# Patient Record
Sex: MALE | HISPANIC OR LATINO | Employment: FULL TIME
[De-identification: names, ages, dates, MRNs, and addresses within clinical notes are randomized per-mention and may not be internally consistent; named-entity substitution may affect disease eponyms.]

---

## 2017-03-13 ENCOUNTER — LAB SERVICES (OUTPATIENT)
Dept: OTHER | Age: 62
End: 2017-03-13

## 2017-03-13 ENCOUNTER — CHARTING TRANS (OUTPATIENT)
Dept: OTHER | Age: 62
End: 2017-03-13

## 2017-03-13 ENCOUNTER — CHARTING TRANS (OUTPATIENT)
Dept: INTERNAL MEDICINE | Age: 62
End: 2017-03-13

## 2017-03-13 LAB
ALBUMIN SERPL BCG-MCNC: 4.5 G/DL (ref 3.6–5.1)
ALP SERPL-CCNC: 84 U/L (ref 30–130)
ALT SERPL W/O P-5'-P-CCNC: 17 U/L (ref 10–35)
AST SERPL-CCNC: 13 U/L (ref 9–37)
BILIRUB SERPL-MCNC: 0.4 MG/DL (ref 0–1)
BUN SERPL-MCNC: 10 MG/DL (ref 6–27)
CALCIUM SERPL-MCNC: 9.3 MG/DL (ref 8.6–10.6)
CHLORIDE SERPL-SCNC: 103 MMOL/L (ref 96–107)
CHOLEST SERPL-MCNC: 221 MG/DL (ref 140–200)
CREATININE, SERUM: 0.8 MG/DL (ref 0.6–1.6)
CRP SERPL-MCNC: <0.5 MG/DL (ref 0–1)
DIFFERENTIAL TYPE: ABNORMAL
GFR SERPL CREATININE-BSD FRML MDRD: >60 ML/MIN/{1.73M2}
GFR SERPL CREATININE-BSD FRML MDRD: >60 ML/MIN/{1.73M2}
GLUCOSE P FAST SERPL-MCNC: 108 MG/DL (ref 60–100)
HCO3 SERPL-SCNC: 30 MMOL/L (ref 22–32)
HDLC SERPL-MCNC: 45 MG/DL
HEMATOCRIT: 46.4 % (ref 40–51)
HEMOGLOBIN: 16.5 G/DL (ref 13.7–17.5)
LDLC SERPL CALC-MCNC: 156 MG/DL (ref 0–100)
LYMPH PERCENT: 20.7 % (ref 20.5–51.1)
LYMPHOCYTE ABSOLUTE #: 1.1 10*3/UL (ref 1.2–3.4)
MEAN CORPUSCULAR HGB CONCENTRATION: 35.6 % (ref 32–36)
MEAN CORPUSCULAR HGB: 30 PG (ref 27–34)
MEAN CORPUSCULAR VOLUME: 84.4 FL (ref 79–95)
MEAN PLATELET VOLUME: 9.6 FL (ref 8.6–12.4)
MIXED %: 7.1 % (ref 4.3–12.9)
MIXED ABSOLUTE #: 0.4 10*3/UL (ref 0.2–0.9)
NEUTROPHIL ABSOLUTE #: 3.8 10*3/UL (ref 1.4–6.5)
NEUTROPHIL PERCENT: 72.2 % (ref 34–73.5)
PLATELET COUNT: 194 10*3/UL (ref 150–400)
POTASSIUM SERPL-SCNC: 4.5 MMOL/L (ref 3.5–5.3)
PROT SERPL-MCNC: 6.8 G/DL (ref 6.2–8.1)
RED BLOOD CELL COUNT: 5.5 10*6/UL (ref 3.9–5.7)
RED CELL DISTRIBUTION WIDTH: 12.9 % (ref 11.3–14.8)
SODIUM SERPL-SCNC: 141 MMOL/L (ref 136–146)
TRIGL SERPL-MCNC: 102 MG/DL (ref 0–200)
WHITE BLOOD CELL COUNT: 5.3 10*3/UL (ref 4–10)

## 2017-04-21 ENCOUNTER — CHARTING TRANS (OUTPATIENT)
Dept: OTHER | Age: 62
End: 2017-04-21

## 2017-07-12 ENCOUNTER — TELEPHONE (OUTPATIENT)
Dept: SURGERY | Facility: CLINIC | Age: 62
End: 2017-07-12

## 2017-07-12 NOTE — TELEPHONE ENCOUNTER
Patient needs opinion on cervical problem.     Please place on my schedule for Friday 7/14 at 12:30  Please call patient to up date info for [facility] insurance  [phone]

## 2017-07-13 ENCOUNTER — TELEPHONE (OUTPATIENT)
Dept: SURGERY | Facility: CLINIC | Age: 62
End: 2017-07-13

## 2017-07-14 ENCOUNTER — HOSPITAL ENCOUNTER (OUTPATIENT)
Dept: GENERAL RADIOLOGY | Facility: HOSPITAL | Age: 62
Discharge: HOME | End: 2017-07-14
Attending: PHYSICIAN ASSISTANT
Payer: OTHER MISCELLANEOUS

## 2017-07-14 ENCOUNTER — OFFICE VISIT (OUTPATIENT)
Dept: SURGERY | Facility: CLINIC | Age: 62
End: 2017-07-14

## 2017-07-14 DIAGNOSIS — R26.9 GAIT DISORDER: ICD-10-CM

## 2017-07-14 DIAGNOSIS — M47.816 LUMBAR SPONDYLOSIS: ICD-10-CM

## 2017-07-14 DIAGNOSIS — R29.898 WEAKNESS OF LEFT LEG: ICD-10-CM

## 2017-07-14 DIAGNOSIS — R29.898 WEAKNESS OF BOTH ARMS: ICD-10-CM

## 2017-07-14 DIAGNOSIS — M50.022 CERVICAL DISC DISORDER AT C5-C6 LEVEL WITH MYELOPATHY: ICD-10-CM

## 2017-07-14 DIAGNOSIS — R29.898 SHOULDER WEAKNESS: ICD-10-CM

## 2017-07-14 DIAGNOSIS — M50.022 CERVICAL DISC DISORDER AT C5-C6 LEVEL WITH MYELOPATHY: Primary | ICD-10-CM

## 2017-07-14 DIAGNOSIS — M75.02 ADHESIVE CAPSULITIS OF LEFT SHOULDER: ICD-10-CM

## 2017-07-14 PROBLEM — G95.9 CERVICAL MYELOPATHY WITH CERVICAL RADICULOPATHY (HCC): Status: ACTIVE | Noted: 2017-07-14

## 2017-07-14 PROBLEM — M54.12 CERVICAL MYELOPATHY WITH CERVICAL RADICULOPATHY (HCC): Status: ACTIVE | Noted: 2017-07-14

## 2017-07-14 PROCEDURE — 99204 OFFICE O/P NEW MOD 45 MIN: CPT | Performed by: PHYSICIAN ASSISTANT

## 2017-07-14 PROCEDURE — 72114 X-RAY EXAM L-S SPINE BENDING: CPT | Performed by: PHYSICIAN ASSISTANT

## 2017-07-14 PROCEDURE — 72052 X-RAY EXAM NECK SPINE 6/>VWS: CPT | Performed by: PHYSICIAN ASSISTANT

## 2017-07-14 RX ORDER — METHYLPREDNISOLONE 4 MG/1
TABLET ORAL
Qty: 1 PACKAGE | Refills: 0 | Status: SHIPPED | OUTPATIENT
Start: 2017-07-14 | End: 2017-07-27 | Stop reason: ALTCHOICE

## 2017-07-14 RX ORDER — HYDROCODONE BITARTRATE AND ACETAMINOPHEN 10; 325 MG/1; MG/1
1 TABLET ORAL EVERY 6 HOURS PRN
Qty: 90 TABLET | Refills: 0 | Status: SHIPPED | OUTPATIENT
Start: 2017-07-14 | End: 2017-09-06

## 2017-07-14 RX ORDER — CYCLOBENZAPRINE HCL 10 MG
10 TABLET ORAL 3 TIMES DAILY PRN
Qty: 45 TABLET | Refills: 0 | Status: SHIPPED | OUTPATIENT
Start: 2017-07-14 | End: 2017-09-06

## 2017-07-14 NOTE — PROGRESS NOTES
Location of Pain: neck pain radiating down left arm    Date Pain Began: 2 weeks after surgery for WC (11/28/2016)          Work Related:   Yes        Receiving Work Comp/Disability:   Yes    Numeric Rating Scale:  Pain at Present:  2

## 2017-07-14 NOTE — H&P
Neurosurgery Consultation      REASON FOR CONSULT: Cervical myelopathy    HISTORY OF PRESENT Elizabeth Nicholas is a 63year old [address] male here for cervical pain left arm pain and weakness and left leg weakness.     Patient gives a history of being a pi therapy immediately he explained his therapist how much pain he was having which was atypical from his first experience. The pain progressed into left bicipital throbbing and aching and burning.   Then started to shoot down into his left second through fou trying to get dressed brushes teeth or use his left arm and manipulation of his neck as far as turning. Complains of pain down the left C6 and C7 dermatome going into the index middle and ring finger. He has pain into the left biceps.   He gets numbness a 20 yrs ago  3/11/16, 11/08/2016: OTHER Left      Comment: left index finger  25 yrs ago: OTHER Left      Comment: achillis tendon repair  1982: OTHER Left      Comment: knee scope     FAMILY HISTORY:  family history is not on file.     SOCIAL HISTORY:   rep flexion-extension. Negative straight leg raise. With cervical forward flexion he gets spasm and tightness in his left wrist and forearm and his left lower leg. . With cervical extension and right rotation spasticity improves.     Upper extremity strength: appears to be myelopathy and difficulty with ambulation. 5. Work: Off work pending reevaluation  6. FU with Dr Felisa Woodson for left fourth finger as requested.   &. PT to maintain motion in left shoulder    It appears from the patient's clinical history that

## 2017-07-19 ENCOUNTER — TELEPHONE (OUTPATIENT)
Dept: SURGERY | Facility: CLINIC | Age: 62
End: 2017-07-19

## 2017-07-19 NOTE — TELEPHONE ENCOUNTER
Please change apt from 7/20 to next Thursday 7/27 at 2:30pm  Patient is aware due his MRIs are schedule for 7/25  Thanks  [place]

## 2017-07-20 ENCOUNTER — TELEPHONE (OUTPATIENT)
Dept: SURGERY | Facility: CLINIC | Age: 62
End: 2017-07-20

## 2017-07-20 RX ORDER — LORAZEPAM 2 MG/1
2 TABLET ORAL ONCE
Qty: 2 TABLET | Refills: 0 | Status: SHIPPED | OUTPATIENT
Start: 2017-07-20 | End: 2017-07-20

## 2017-07-21 ENCOUNTER — TELEPHONE (OUTPATIENT)
Dept: SURGERY | Facility: CLINIC | Age: 62
End: 2017-07-21

## 2017-07-25 ENCOUNTER — HOSPITAL ENCOUNTER (OUTPATIENT)
Dept: GENERAL RADIOLOGY | Age: 62
Discharge: HOME | End: 2017-07-25
Attending: PHYSICIAN ASSISTANT
Payer: OTHER MISCELLANEOUS

## 2017-07-25 ENCOUNTER — HOSPITAL ENCOUNTER (OUTPATIENT)
Dept: MRI IMAGING | Age: 62
Discharge: HOME | End: 2017-07-25
Attending: PHYSICIAN ASSISTANT
Payer: OTHER MISCELLANEOUS

## 2017-07-25 DIAGNOSIS — Z13.89 ENCOUNTER FOR IMAGING TO SCREEN FOR METAL PRIOR TO MRI: ICD-10-CM

## 2017-07-25 DIAGNOSIS — M47.816 LUMBAR SPONDYLOSIS: ICD-10-CM

## 2017-07-25 DIAGNOSIS — M50.022 CERVICAL DISC DISORDER AT C5-C6 LEVEL WITH MYELOPATHY: ICD-10-CM

## 2017-07-25 DIAGNOSIS — R26.9 GAIT DISORDER: ICD-10-CM

## 2017-07-25 DIAGNOSIS — R29.898 WEAKNESS OF BOTH ARMS: ICD-10-CM

## 2017-07-25 DIAGNOSIS — R29.898 WEAKNESS OF LEFT LEG: ICD-10-CM

## 2017-07-25 DIAGNOSIS — R29.898 SHOULDER WEAKNESS: ICD-10-CM

## 2017-07-25 PROCEDURE — 72141 MRI NECK SPINE W/O DYE: CPT | Performed by: PHYSICIAN ASSISTANT

## 2017-07-25 PROCEDURE — 70030 X-RAY EYE FOR FOREIGN BODY: CPT | Performed by: PHYSICIAN ASSISTANT

## 2017-07-25 PROCEDURE — 72148 MRI LUMBAR SPINE W/O DYE: CPT | Performed by: PHYSICIAN ASSISTANT

## 2017-07-27 ENCOUNTER — OFFICE VISIT (OUTPATIENT)
Dept: SURGERY | Facility: CLINIC | Age: 62
End: 2017-07-27

## 2017-07-27 VITALS
HEART RATE: 108 BPM | BODY MASS INDEX: 33.34 KG/M2 | DIASTOLIC BLOOD PRESSURE: 102 MMHG | WEIGHT: 220 LBS | HEIGHT: 68 IN | SYSTOLIC BLOOD PRESSURE: 178 MMHG

## 2017-07-27 DIAGNOSIS — R29.898 WEAKNESS OF LEFT LEG: ICD-10-CM

## 2017-07-27 DIAGNOSIS — M50.022 CERVICAL DISC DISORDER AT C5-C6 LEVEL WITH MYELOPATHY: Primary | ICD-10-CM

## 2017-07-27 DIAGNOSIS — R29.898 WEAKNESS OF BOTH ARMS: ICD-10-CM

## 2017-07-27 PROCEDURE — 99213 OFFICE O/P EST LOW 20 MIN: CPT | Performed by: PHYSICIAN ASSISTANT

## 2017-07-27 RX ORDER — AMLODIPINE BESYLATE 5 MG/1
TABLET ORAL
Refills: 6 | Status: ON HOLD | COMMUNITY
Start: 2017-05-17 | End: 2019-01-01

## 2017-07-27 RX ORDER — METOPROLOL SUCCINATE 25 MG/1
25 TABLET, EXTENDED RELEASE ORAL DAILY
Refills: 5 | COMMUNITY

## 2017-07-27 RX ORDER — SIMVASTATIN 20 MG
TABLET ORAL
Refills: 6 | Status: ON HOLD | COMMUNITY
Start: 2017-06-22 | End: 2019-01-01

## 2017-07-27 NOTE — PATIENT INSTRUCTIONS
Refill policies:    • Allow 2-3 business days for refills; controlled substances may take longer.   • Contact your pharmacy at least 5 days prior to running out of medication and have them send an electronic request or submit request through the [facility] have a procedure or additional testing performed. [facility]) will contact your insurance carrier to obtain pre-certification or prior authorization.     Unfortunately, [facility] has seen an increase in denial of payment even though the p

## 2017-07-27 NOTE — PROGRESS NOTES
Neurosurgery follow up        REASON FOR CONSULT: Cervical myelopathy     HISTORY OF PRESENT Ethyl Midget is a 63year old LH male here in follow up after MRI CS and LS spine. He continues with left cervical radiculopathy. Medrol helped.  PT is finger.     He underwent surgery on November 8, 2016 to repair the A2 and A4 pulleys. He had a [place] block without complication.   After the surgery he felt a difference in his left hand he had sharp stabbing pain in his left hand that he did experience fro upper thoracic spine he gets pain into both scapular the left is much worse in the right it is a sharp pain he gets tingling down his back when he moves his neck. He complains of lower back stiffness and tightness and low back pain which is a 4/10.   State neurological complaints. He does have intermittent tinnitus in the right ear he has natural hearing loss due to his job.            PAST MEDICAL HISTORY:       Past Medical History:   Diagnosis Date   • Essential hypertension     • Hyperlipidemia          clonus on the right, 3 beats of clonus on the left. Patient can heel and toe walk. Positive left Spurling's. Bilateral Gonzalez's. Difficulty with tandem stance with eyes closed with significant unsteadiness.   Patient can toe walk with full weightbearing for formulating a clear diagnosis     ASSESSMENT:  1. Cervical 5-6 radiculopathy with myelopathy in the upper and lower extremities  2. Left upper extremity weakness, bilateral hand weakness left lower extremity weakness  3. Gait disturbance  4.   Status

## 2017-07-31 ENCOUNTER — TELEPHONE (OUTPATIENT)
Dept: SURGERY | Facility: CLINIC | Age: 62
End: 2017-07-31

## 2017-08-02 ENCOUNTER — TELEPHONE (OUTPATIENT)
Dept: SURGERY | Facility: CLINIC | Age: 62
End: 2017-08-02

## 2017-08-07 ENCOUNTER — TELEPHONE (OUTPATIENT)
Dept: SURGERY | Facility: CLINIC | Age: 62
End: 2017-08-07

## 2017-08-07 NOTE — TELEPHONE ENCOUNTER
Please call patient and make an appointment for consult with us on 8/21/17 at 1:30 PM to be evaluated for injection.

## 2017-08-07 NOTE — TELEPHONE ENCOUNTER
Pt scheduled WC Consult appt for 8/21 with [facility] for evaluation for injections

## 2017-08-10 NOTE — TELEPHONE ENCOUNTER
infromed pt that WC denied eligibility for 8/21 appt -pt will have to use his regular insurance,pt understood and no further questions

## 2017-08-11 ENCOUNTER — TELEPHONE (OUTPATIENT)
Dept: SURGERY | Facility: CLINIC | Age: 62
End: 2017-08-11

## 2017-08-11 NOTE — TELEPHONE ENCOUNTER
Patient called stating having a decline in his symptoms he is getting more weakness. In spite of physical therapy he would like to schedule surgery. We will see him on Tuesday at noon.

## 2017-08-15 ENCOUNTER — TELEPHONE (OUTPATIENT)
Dept: SURGERY | Facility: CLINIC | Age: 62
End: 2017-08-15

## 2017-08-15 ENCOUNTER — OFFICE VISIT (OUTPATIENT)
Dept: SURGERY | Facility: CLINIC | Age: 62
End: 2017-08-15

## 2017-08-15 VITALS
HEIGHT: 68 IN | BODY MASS INDEX: 33.34 KG/M2 | SYSTOLIC BLOOD PRESSURE: 150 MMHG | DIASTOLIC BLOOD PRESSURE: 96 MMHG | HEART RATE: 80 BPM | WEIGHT: 220 LBS

## 2017-08-15 DIAGNOSIS — M50.022 CERVICAL DISC DISORDER AT C5-C6 LEVEL WITH MYELOPATHY: Primary | ICD-10-CM

## 2017-08-15 DIAGNOSIS — G95.9 CERVICAL MYELOPATHY WITH CERVICAL RADICULOPATHY (HCC): Primary | ICD-10-CM

## 2017-08-15 DIAGNOSIS — M54.12 CERVICAL MYELOPATHY WITH CERVICAL RADICULOPATHY (HCC): Primary | ICD-10-CM

## 2017-08-15 PROCEDURE — 99213 OFFICE O/P EST LOW 20 MIN: CPT | Performed by: PHYSICIAN ASSISTANT

## 2017-08-15 NOTE — PROGRESS NOTES
Pt. States leg is still weak and feels burning at base of neck, arm pain when lifting and pinching dull pain between scapula still remains.

## 2017-08-15 NOTE — PATIENT INSTRUCTIONS
Refill policies:    • Allow 2-3 business days for refills; controlled substances may take longer.   • Contact your pharmacy at least 5 days prior to running out of medication and have them send an electronic request or submit request through the [facility] have a procedure or additional testing performed. [facility]) will contact your insurance carrier to obtain pre-certification or prior authorization.     Unfortunately, [facility] has seen an increase in denial of payment even though the p

## 2017-08-15 NOTE — PROGRESS NOTES
Neurosurgery follow up        REASON FOR CONSULT: Cervical myelopathy     HISTORY OF PRESENT Fabian Samano is a 63year old LH male  returns today stating that with therapy and the Medrol he got slight improvement at his last visit.   He has had after which time there was another pop. He was diagnosed with another rupture of the A2 pulley was placed in rehab from July until September he was placed in work conditioning program.     September 2016 he saw Dr. Julito Pugh for his left finger disability. weakness. Denies any weakness in the arms or legs.   Denies any left finger problems prior to his injury November 30, 2015.     He denies any cervical symptoms left arm radiculopathy or pain numbness or weakness or any leg complaints prior to anesthesia on 8 miles a day. Now he has difficulty with ambulation his maximum distance about 3 blocks where his legs get tired and his left leg kind of drags. He has difficulty getting from a sitting to standing position he has difficulty on uneven surfaces.   He is t bilaterally     NEUROLOGICAL:  last exam  This patient is alert and orientated x 3. Speech fluent. Comprehension intact. Face is symmetrical. Sensation to light touch is intact bilateral in both arms and legs to touch.   Decreased pin sensation in both up lumbar spine 7/25/17 L4-5-S1 spondylosis without herniation os significant stenosis        MRI of the cervical spine brings for review from [facility] date 5/5/17 is a suboptimal study with images only partially of sagittal T2 that does not c

## 2017-08-15 NOTE — TELEPHONE ENCOUNTER
Dr. Junior Nails write a letter to the petition the Workmen's Comp. Denial    Patient would like to put his surgery through his private insurance if not approved. Patient needs a C5-6 artificial disc replacement.   Possibly C6-7

## 2017-08-17 ENCOUNTER — TELEPHONE (OUTPATIENT)
Dept: SURGERY | Facility: CLINIC | Age: 62
End: 2017-08-17

## 2017-08-24 ENCOUNTER — TELEPHONE (OUTPATIENT)
Dept: SURGERY | Facility: CLINIC | Age: 62
End: 2017-08-24

## 2017-09-01 NOTE — TELEPHONE ENCOUNTER
Patient called asking about surgery informed him of below update. Will need to touch base with Denice/ to find out what the status is. Will call patient back next week once clarification is received. Patient understood.

## 2017-09-06 ENCOUNTER — TELEPHONE (OUTPATIENT)
Dept: SURGERY | Facility: CLINIC | Age: 62
End: 2017-09-06

## 2017-09-06 RX ORDER — CYCLOBENZAPRINE HCL 10 MG
10 TABLET ORAL 3 TIMES DAILY PRN
Qty: 60 TABLET | Refills: 2 | Status: SHIPPED | OUTPATIENT
Start: 2017-09-06 | End: 2017-10-16

## 2017-09-06 RX ORDER — HYDROCODONE BITARTRATE AND ACETAMINOPHEN 10; 325 MG/1; MG/1
1 TABLET ORAL EVERY 6 HOURS PRN
Qty: 90 TABLET | Refills: 0 | Status: ON HOLD | OUTPATIENT
Start: 2017-09-06 | End: 2017-10-03

## 2017-09-06 NOTE — TELEPHONE ENCOUNTER
Surgery denied by WC. Patient is getting more myelopathic and wishes to schedule surgery through his regular insurance.

## 2017-09-07 ENCOUNTER — LAB SERVICES (OUTPATIENT)
Dept: OTHER | Age: 62
End: 2017-09-07

## 2017-09-07 ENCOUNTER — CHARTING TRANS (OUTPATIENT)
Dept: INTERNAL MEDICINE | Age: 62
End: 2017-09-07

## 2017-09-07 LAB
ALBUMIN SERPL BCG-MCNC: 4.6 G/DL (ref 3.6–5.1)
ALP SERPL-CCNC: 82 U/L (ref 30–130)
ALT SERPL W/O P-5'-P-CCNC: 17 U/L (ref 10–35)
AST SERPL-CCNC: 13 U/L (ref 9–37)
BILIRUB SERPL-MCNC: 0.4 MG/DL (ref 0–1)
BUN SERPL-MCNC: 13 MG/DL (ref 6–27)
CALCIUM SERPL-MCNC: 9.2 MG/DL (ref 8.6–10.6)
CHLORIDE SERPL-SCNC: 103 MMOL/L (ref 96–107)
CHOLEST SERPL-MCNC: 150 MG/DL (ref 140–200)
CREATININE, SERUM: 0.9 MG/DL (ref 0.6–1.6)
GFR SERPL CREATININE-BSD FRML MDRD: >60 ML/MIN/{1.73M2}
GFR SERPL CREATININE-BSD FRML MDRD: >60 ML/MIN/{1.73M2}
GLUCOSE P FAST SERPL-MCNC: 101 MG/DL (ref 60–100)
HCO3 SERPL-SCNC: 28 MMOL/L (ref 22–32)
HDLC SERPL-MCNC: 46 MG/DL
LDLC SERPL CALC-MCNC: 88 MG/DL (ref 0–100)
POTASSIUM SERPL-SCNC: 4.1 MMOL/L (ref 3.5–5.3)
PROT SERPL-MCNC: 6.5 G/DL (ref 6.2–8.1)
SODIUM SERPL-SCNC: 139 MMOL/L (ref 136–146)
TRIGL SERPL-MCNC: 80 MG/DL (ref 0–200)

## 2017-09-07 NOTE — TELEPHONE ENCOUNTER
We can offer 10/2 or 10/9 for surgery and will discuss with Dr. Carlitos Hughes next about moving up his date. [place], [place] is aware of the scheduling situation.      Patient scheduled for C5-6 ADR on 10/2/2017 with Dr. Carlitos Hughes    Pre-op instructions discussed with elisa ICD-10--M50.022  CPT---73532

## 2017-09-08 ENCOUNTER — TELEPHONE (OUTPATIENT)
Dept: SURGERY | Facility: CLINIC | Age: 62
End: 2017-09-08

## 2017-09-11 ENCOUNTER — CHARTING TRANS (OUTPATIENT)
Dept: OTHER | Age: 62
End: 2017-09-11

## 2017-09-11 NOTE — TELEPHONE ENCOUNTER
Clarification of surgical procedure sent to OR schedulers. Consent should read: C5-6 discectomy and ADR, Possible C6-7 discectomy and ADR.

## 2017-09-11 NOTE — TELEPHONE ENCOUNTER
Have attempted to fax medical records and request for medical clearance to PCP office. Fax # provided does not go through. Patient calling office and states he had physical done last week, asking if this is ok for clearance.  I informed patient that his PCP

## 2017-09-13 ENCOUNTER — APPOINTMENT (OUTPATIENT)
Dept: LAB | Facility: HOSPITAL | Age: 62
End: 2017-09-13
Payer: COMMERCIAL

## 2017-09-13 ENCOUNTER — LABORATORY ENCOUNTER (OUTPATIENT)
Dept: LAB | Facility: HOSPITAL | Age: 62
End: 2017-09-13
Attending: NEUROLOGICAL SURGERY
Payer: COMMERCIAL

## 2017-09-13 DIAGNOSIS — M54.12 CERVICAL MYELOPATHY WITH CERVICAL RADICULOPATHY (HCC): ICD-10-CM

## 2017-09-13 DIAGNOSIS — G95.9 CERVICAL MYELOPATHY WITH CERVICAL RADICULOPATHY (HCC): ICD-10-CM

## 2017-09-13 LAB
ALBUMIN SERPL-MCNC: 3.9 G/DL (ref 3.5–4.8)
ALP LIVER SERPL-CCNC: 93 U/L (ref 45–117)
ALT SERPL-CCNC: 39 U/L (ref 17–63)
APTT PPP: 30.4 SECONDS (ref 25–34)
AST SERPL-CCNC: 24 U/L (ref 15–41)
BASOPHILS # BLD AUTO: 0.03 X10(3) UL (ref 0–0.1)
BASOPHILS NFR BLD AUTO: 0.4 %
BILIRUB SERPL-MCNC: 0.5 MG/DL (ref 0.1–2)
BUN BLD-MCNC: 17 MG/DL (ref 8–20)
CALCIUM BLD-MCNC: 8.7 MG/DL (ref 8.3–10.3)
CHLORIDE: 107 MMOL/L (ref 101–111)
CO2: 28 MMOL/L (ref 22–32)
CREAT BLD-MCNC: 1 MG/DL (ref 0.7–1.3)
EOSINOPHIL # BLD AUTO: 0.32 X10(3) UL (ref 0–0.3)
EOSINOPHIL NFR BLD AUTO: 4.5 %
ERYTHROCYTE [DISTWIDTH] IN BLOOD BY AUTOMATED COUNT: 12.1 % (ref 11.5–16)
GLUCOSE BLD-MCNC: 93 MG/DL (ref 70–99)
HCT VFR BLD AUTO: 45.7 % (ref 37–53)
HGB BLD-MCNC: 15.5 G/DL (ref 13–17)
IMMATURE GRANULOCYTE COUNT: 0.02 X10(3) UL (ref 0–1)
IMMATURE GRANULOCYTE RATIO %: 0.3 %
INR BLD: 1.04 (ref 0.89–1.11)
LYMPHOCYTES # BLD AUTO: 1.9 X10(3) UL (ref 0.9–4)
LYMPHOCYTES NFR BLD AUTO: 27 %
M PROTEIN MFR SERPL ELPH: 7.1 G/DL (ref 6.1–8.3)
MCH RBC QN AUTO: 29.1 PG (ref 27–33.2)
MCHC RBC AUTO-ENTMCNC: 33.9 G/DL (ref 31–37)
MCV RBC AUTO: 85.9 FL (ref 80–99)
MONOCYTES # BLD AUTO: 0.7 X10(3) UL (ref 0.1–0.6)
MONOCYTES NFR BLD AUTO: 9.9 %
NEUTROPHIL ABS PRELIM: 4.07 X10 (3) UL (ref 1.3–6.7)
NEUTROPHILS # BLD AUTO: 4.07 X10(3) UL (ref 1.3–6.7)
NEUTROPHILS NFR BLD AUTO: 57.9 %
PLATELET # BLD AUTO: 207 10(3)UL (ref 150–450)
POTASSIUM SERPL-SCNC: 4.8 MMOL/L (ref 3.6–5.1)
PSA SERPL DL<=0.01 NG/ML-MCNC: 13.6 SECONDS (ref 12–14.3)
RBC # BLD AUTO: 5.32 X10(6)UL (ref 4.3–5.7)
RED CELL DISTRIBUTION WIDTH-SD: 37.8 FL (ref 35.1–46.3)
SODIUM SERPL-SCNC: 141 MMOL/L (ref 136–144)
WBC # BLD AUTO: 7 X10(3) UL (ref 4–13)

## 2017-09-13 PROCEDURE — 87081 CULTURE SCREEN ONLY: CPT

## 2017-09-13 PROCEDURE — 85610 PROTHROMBIN TIME: CPT

## 2017-09-13 PROCEDURE — 85025 COMPLETE CBC W/AUTO DIFF WBC: CPT

## 2017-09-13 PROCEDURE — 93010 ELECTROCARDIOGRAM REPORT: CPT | Performed by: INTERNAL MEDICINE

## 2017-09-13 PROCEDURE — 93005 ELECTROCARDIOGRAM TRACING: CPT

## 2017-09-13 PROCEDURE — 80053 COMPREHEN METABOLIC PANEL: CPT

## 2017-09-13 PROCEDURE — 85730 THROMBOPLASTIN TIME PARTIAL: CPT

## 2017-09-13 PROCEDURE — 36415 COLL VENOUS BLD VENIPUNCTURE: CPT

## 2017-09-14 ENCOUNTER — CHARTING TRANS (OUTPATIENT)
Dept: OTHER | Age: 62
End: 2017-09-14

## 2017-09-14 ENCOUNTER — CHARTING TRANS (OUTPATIENT)
Dept: INTERNAL MEDICINE | Age: 62
End: 2017-09-14

## 2017-09-14 LAB
ATRIAL RATE: 84 BPM
P AXIS: 54 DEGREES
P-R INTERVAL: 168 MS
Q-T INTERVAL: 376 MS
QRS DURATION: 118 MS
QTC CALCULATION (BEZET): 444 MS
R AXIS: 33 DEGREES
T AXIS: -2 DEGREES
VENTRICULAR RATE: 84 BPM

## 2017-09-20 ENCOUNTER — HOSPITAL ENCOUNTER (OUTPATIENT)
Dept: GENERAL RADIOLOGY | Facility: HOSPITAL | Age: 62
Discharge: HOME | End: 2017-09-20
Attending: INTERNAL MEDICINE
Payer: COMMERCIAL

## 2017-09-20 DIAGNOSIS — Z01.818 PRE-OP TESTING: ICD-10-CM

## 2017-09-20 PROCEDURE — 71020 XR CHEST PA + LAT CHEST (CPT=71020): CPT | Performed by: INTERNAL MEDICINE

## 2017-09-21 NOTE — TELEPHONE ENCOUNTER
Patient has been cleared by PCP for surgery. H&P available in media tab. Nothing further needed for upcoming surgery.

## 2017-09-25 NOTE — TELEPHONE ENCOUNTER
Patient called and wanted to know about clearance. Informed patient we already received medical clearance from his PCP and that nothing further is needed for surgery.  Patient understood and was thankful and just asked that we fax the EKG results to his PCP

## 2017-09-26 ENCOUNTER — CHARTING TRANS (OUTPATIENT)
Dept: INTERNAL MEDICINE | Age: 62
End: 2017-09-26

## 2017-10-02 ENCOUNTER — ANESTHESIA EVENT (OUTPATIENT)
Dept: SURGERY | Facility: HOSPITAL | Age: 62
End: 2017-10-02
Payer: COMMERCIAL

## 2017-10-02 ENCOUNTER — SURGERY (OUTPATIENT)
Age: 62
End: 2017-10-02

## 2017-10-02 ENCOUNTER — HOSPITAL ENCOUNTER (OUTPATIENT)
Facility: HOSPITAL | Age: 62
Setting detail: OBSERVATION
Discharge: HOME | End: 2017-10-03
Attending: NEUROLOGICAL SURGERY | Admitting: NEUROLOGICAL SURGERY
Payer: COMMERCIAL

## 2017-10-02 ENCOUNTER — APPOINTMENT (OUTPATIENT)
Dept: GENERAL RADIOLOGY | Facility: HOSPITAL | Age: 62
End: 2017-10-02
Attending: NEUROLOGICAL SURGERY
Payer: COMMERCIAL

## 2017-10-02 ENCOUNTER — ANESTHESIA (OUTPATIENT)
Dept: SURGERY | Facility: HOSPITAL | Age: 62
End: 2017-10-02
Payer: COMMERCIAL

## 2017-10-02 DIAGNOSIS — M54.12 CERVICAL MYELOPATHY WITH CERVICAL RADICULOPATHY (HCC): Primary | ICD-10-CM

## 2017-10-02 DIAGNOSIS — G95.9 CERVICAL MYELOPATHY WITH CERVICAL RADICULOPATHY (HCC): Primary | ICD-10-CM

## 2017-10-02 PROCEDURE — 76001 XR FLUOROSCOPE EXAM >1 HR EXTENSIVE (CPT=76001): CPT | Performed by: NEUROLOGICAL SURGERY

## 2017-10-02 PROCEDURE — 99243 OFF/OP CNSLTJ NEW/EST LOW 30: CPT | Performed by: HOSPITALIST

## 2017-10-02 PROCEDURE — 0RR30JZ REPLACEMENT OF CERVICAL VERTEBRAL DISC WITH SYNTHETIC SUBSTITUTE, OPEN APPROACH: ICD-10-PCS | Performed by: NEUROLOGICAL SURGERY

## 2017-10-02 RX ORDER — DIPHENHYDRAMINE HYDROCHLORIDE 50 MG/ML
25 INJECTION INTRAMUSCULAR; INTRAVENOUS EVERY 4 HOURS PRN
Status: DISCONTINUED | OUTPATIENT
Start: 2017-10-02 | End: 2017-10-03

## 2017-10-02 RX ORDER — SODIUM CHLORIDE, SODIUM LACTATE, POTASSIUM CHLORIDE, CALCIUM CHLORIDE 600; 310; 30; 20 MG/100ML; MG/100ML; MG/100ML; MG/100ML
INJECTION, SOLUTION INTRAVENOUS CONTINUOUS
Status: DISCONTINUED | OUTPATIENT
Start: 2017-10-02 | End: 2017-10-02

## 2017-10-02 RX ORDER — AMLODIPINE BESYLATE 5 MG/1
5 TABLET ORAL 2 TIMES DAILY WITH MEALS
Status: DISCONTINUED | OUTPATIENT
Start: 2017-10-02 | End: 2017-10-03

## 2017-10-02 RX ORDER — MORPHINE SULFATE 4 MG/ML
2 INJECTION, SOLUTION INTRAMUSCULAR; INTRAVENOUS EVERY 2 HOUR PRN
Status: DISCONTINUED | OUTPATIENT
Start: 2017-10-02 | End: 2017-10-03

## 2017-10-02 RX ORDER — SENNOSIDES 8.6 MG
17.2 TABLET ORAL NIGHTLY
Status: DISCONTINUED | OUTPATIENT
Start: 2017-10-02 | End: 2017-10-03

## 2017-10-02 RX ORDER — MORPHINE SULFATE 4 MG/ML
1 INJECTION, SOLUTION INTRAMUSCULAR; INTRAVENOUS EVERY 2 HOUR PRN
Status: DISCONTINUED | OUTPATIENT
Start: 2017-10-02 | End: 2017-10-03

## 2017-10-02 RX ORDER — NALOXONE HYDROCHLORIDE 0.4 MG/ML
80 INJECTION, SOLUTION INTRAMUSCULAR; INTRAVENOUS; SUBCUTANEOUS AS NEEDED
Status: DISCONTINUED | OUTPATIENT
Start: 2017-10-02 | End: 2017-10-02 | Stop reason: HOSPADM

## 2017-10-02 RX ORDER — HYDROCODONE BITARTRATE AND ACETAMINOPHEN 10; 325 MG/1; MG/1
2 TABLET ORAL EVERY 4 HOURS PRN
Status: DISCONTINUED | OUTPATIENT
Start: 2017-10-02 | End: 2017-10-03

## 2017-10-02 RX ORDER — MORPHINE SULFATE 4 MG/ML
4 INJECTION, SOLUTION INTRAMUSCULAR; INTRAVENOUS EVERY 2 HOUR PRN
Status: DISCONTINUED | OUTPATIENT
Start: 2017-10-02 | End: 2017-10-03

## 2017-10-02 RX ORDER — POLYETHYLENE GLYCOL 3350 17 G/17G
17 POWDER, FOR SOLUTION ORAL DAILY PRN
Status: DISCONTINUED | OUTPATIENT
Start: 2017-10-02 | End: 2017-10-03

## 2017-10-02 RX ORDER — ONDANSETRON 2 MG/ML
4 INJECTION INTRAMUSCULAR; INTRAVENOUS AS NEEDED
Status: DISCONTINUED | OUTPATIENT
Start: 2017-10-02 | End: 2017-10-02 | Stop reason: HOSPADM

## 2017-10-02 RX ORDER — ONDANSETRON 2 MG/ML
4 INJECTION INTRAMUSCULAR; INTRAVENOUS EVERY 4 HOURS PRN
Status: ACTIVE | OUTPATIENT
Start: 2017-10-02 | End: 2017-10-03

## 2017-10-02 RX ORDER — METOCLOPRAMIDE HYDROCHLORIDE 5 MG/ML
10 INJECTION INTRAMUSCULAR; INTRAVENOUS AS NEEDED
Status: DISCONTINUED | OUTPATIENT
Start: 2017-10-02 | End: 2017-10-02 | Stop reason: HOSPADM

## 2017-10-02 RX ORDER — SODIUM PHOSPHATE, DIBASIC AND SODIUM PHOSPHATE, MONOBASIC 7; 19 G/133ML; G/133ML
1 ENEMA RECTAL ONCE AS NEEDED
Status: DISCONTINUED | OUTPATIENT
Start: 2017-10-02 | End: 2017-10-03

## 2017-10-02 RX ORDER — METOPROLOL SUCCINATE 25 MG/1
25 TABLET, EXTENDED RELEASE ORAL
Status: DISCONTINUED | OUTPATIENT
Start: 2017-10-02 | End: 2017-10-03

## 2017-10-02 RX ORDER — ALPRAZOLAM 0.25 MG/1
0.25 TABLET ORAL NIGHTLY PRN
Status: DISCONTINUED | OUTPATIENT
Start: 2017-10-02 | End: 2017-10-03

## 2017-10-02 RX ORDER — SODIUM CHLORIDE, SODIUM LACTATE, POTASSIUM CHLORIDE, CALCIUM CHLORIDE 600; 310; 30; 20 MG/100ML; MG/100ML; MG/100ML; MG/100ML
INJECTION, SOLUTION INTRAVENOUS CONTINUOUS
Status: DISCONTINUED | OUTPATIENT
Start: 2017-10-02 | End: 2017-10-03

## 2017-10-02 RX ORDER — HYDROMORPHONE HYDROCHLORIDE 1 MG/ML
0.4 INJECTION, SOLUTION INTRAMUSCULAR; INTRAVENOUS; SUBCUTANEOUS EVERY 5 MIN PRN
Status: DISCONTINUED | OUTPATIENT
Start: 2017-10-02 | End: 2017-10-02 | Stop reason: HOSPADM

## 2017-10-02 RX ORDER — DOCUSATE SODIUM 100 MG/1
100 CAPSULE, LIQUID FILLED ORAL 2 TIMES DAILY
Status: DISCONTINUED | OUTPATIENT
Start: 2017-10-02 | End: 2017-10-03

## 2017-10-02 RX ORDER — ALPRAZOLAM 0.25 MG/1
0.25 TABLET ORAL NIGHTLY PRN
COMMUNITY
End: 2017-11-13 | Stop reason: ALTCHOICE

## 2017-10-02 RX ORDER — DIPHENHYDRAMINE HCL 25 MG
25 CAPSULE ORAL EVERY 4 HOURS PRN
Status: DISCONTINUED | OUTPATIENT
Start: 2017-10-02 | End: 2017-10-03

## 2017-10-02 RX ORDER — CYCLOBENZAPRINE HCL 10 MG
10 TABLET ORAL 3 TIMES DAILY PRN
Status: DISCONTINUED | OUTPATIENT
Start: 2017-10-02 | End: 2017-10-02

## 2017-10-02 RX ORDER — SODIUM CHLORIDE 9 MG/ML
INJECTION, SOLUTION INTRAVENOUS CONTINUOUS
Status: DISCONTINUED | OUTPATIENT
Start: 2017-10-02 | End: 2017-10-03

## 2017-10-02 RX ORDER — BISACODYL 10 MG
10 SUPPOSITORY, RECTAL RECTAL
Status: DISCONTINUED | OUTPATIENT
Start: 2017-10-02 | End: 2017-10-03

## 2017-10-02 RX ORDER — DEXAMETHASONE SODIUM PHOSPHATE 10 MG/ML
4 INJECTION, SOLUTION INTRAMUSCULAR; INTRAVENOUS 3 TIMES DAILY
Status: DISCONTINUED | OUTPATIENT
Start: 2017-10-02 | End: 2017-10-02 | Stop reason: SDUPTHER

## 2017-10-02 RX ORDER — DEXAMETHASONE SODIUM PHOSPHATE 4 MG/ML
4 VIAL (ML) INJECTION 3 TIMES DAILY
Status: COMPLETED | OUTPATIENT
Start: 2017-10-02 | End: 2017-10-03

## 2017-10-02 RX ORDER — CYCLOBENZAPRINE HCL 10 MG
10 TABLET ORAL 3 TIMES DAILY PRN
Status: DISCONTINUED | OUTPATIENT
Start: 2017-10-03 | End: 2017-10-03

## 2017-10-02 RX ORDER — HYDROCODONE BITARTRATE AND ACETAMINOPHEN 10; 325 MG/1; MG/1
1 TABLET ORAL EVERY 4 HOURS PRN
Status: DISCONTINUED | OUTPATIENT
Start: 2017-10-02 | End: 2017-10-03

## 2017-10-02 RX ORDER — ACETAMINOPHEN 325 MG/1
650 TABLET ORAL EVERY 4 HOURS PRN
Status: DISCONTINUED | OUTPATIENT
Start: 2017-10-02 | End: 2017-10-03

## 2017-10-02 RX ORDER — PRAVASTATIN SODIUM 10 MG
10 TABLET ORAL NIGHTLY
Status: DISCONTINUED | OUTPATIENT
Start: 2017-10-02 | End: 2017-10-03

## 2017-10-02 RX ORDER — ORPHENADRINE CITRATE 30 MG/ML
30 INJECTION INTRAMUSCULAR; INTRAVENOUS EVERY 6 HOURS
Status: DISCONTINUED | OUTPATIENT
Start: 2017-10-02 | End: 2017-10-03

## 2017-10-02 RX ORDER — METOCLOPRAMIDE HYDROCHLORIDE 5 MG/ML
10 INJECTION INTRAMUSCULAR; INTRAVENOUS EVERY 6 HOURS PRN
Status: DISCONTINUED | OUTPATIENT
Start: 2017-10-02 | End: 2017-10-03

## 2017-10-02 NOTE — BRIEF OP NOTE
Pre-Operative Diagnosis: Cervical myelopathy with cervical radiculopathy [M47.12]     Post-Operative Diagnosis: Cervical myelopathy with cervical radiculopathy [M47.12]     Procedure Performed:   Procedure(s):  CERVICAL 5- CERVICAL 6 DISCECTOMY AND LOLA

## 2017-10-02 NOTE — PROGRESS NOTES
Pt's PCPis Dr Margaret Celaya. Dr Sheba Villavicencio paged with new consult. Dr Don Sandifer on call. Awaiting call back.

## 2017-10-02 NOTE — ANESTHESIA PREPROCEDURE EVALUATION
PRE-OP EVALUATION    Patient Name: Timo Washington    Pre-op Diagnosis: Cervical myelopathy with cervical radiculopathy [M47.12]    Procedure(s):  CERVICAL 5- CERVICAL 6 DISCECTOMY AND ARTIFICIAL DISC REPLACEMENT,  POSSIBLE CERVICAL 6- CERVICVAL 7 DISCE Comment: 20 yrs ago  3/11/16, 11/08/2016: OTHER Left      Comment: left index finger x 2 - no metal  25 yrs ago: OTHER Left      Comment: achillis tendon repair  1982: OTHER Left      Comment: knee scope      Smoking status: Former Smoker  0.50 Packs/day

## 2017-10-02 NOTE — CONSULTS
[facility]ITAL[facility]  CONSULT     Tomi Arnold Patient Status:  Outpatient in a Bed    1955 MRN [id number]   [facility] 3SW-A Attending Silviano Chen MD   Hosp Day # 0 PCP JENN Najera     Reason for consult: medical man mouth every 6 (six) hours as needed for Pain.  Disp: 90 tablet Rfl: 0   AmLODIPine Besylate 5 MG Oral Tab TK 1 T PO BID Disp:  Rfl: 6   Metoprolol Succinate ER 25 MG Oral Tablet 24 Hr TK 1 T PO D Disp:  Rfl: 5   simvastatin 20 MG Oral Tab TK 1 T PO Q NIGHT Prophylaxis: SCDS  · CODE status: full  · West: no    Plan of care discussed with patient and family    Vashti Ganser, MD  10/2/2017

## 2017-10-02 NOTE — H&P
Neurosurgery H+P        REASON FOR Admission: Cervical myelopathy, C5-6 ADR     HISTORY OF PRESENT ILLNESS:Mamadou Arnold is a 63year [address] male here for Cervical C5-6 ADR. He continues with left arm pain and weakness. Left arm numbness.  Trouble using He was seen at [facility], referred to orthopedics Dr. Darcie Kinney referred patient to a hand specialist Dr. Suyapa Dutta was diagnosed with a left fourth digit contracture.  It was recommended he have surgery to free it up. [facility] underwent surgery March 11 Patient was referred to an orthopedist specializing in shoulder disorders Dr. Felicitas Myers April 19, 2017.  Specialist was of the opinion that his symptoms were not related to his shoulder.  He did a injection in the shoulder it gave some very focal release to Today the patient complains of neck pain which is a 2-6/10. Roopa Lopez gets occasional burning across his upper thoracic spine he gets pain into both scapular the left is much worse in the right it is a sharp pain he gets tingling down his back when he moves his He complains of left leg weakness especially trying to do stairs he feels like there is tightness in his thigh and knee and his left calf and his left foot.  He feels a burning stinging pain into his great toe which is intermittent.  Prior to the left arm SKIN: Warm, dry, without rashes.  Left fourth digit has a scar consistent with his surgery.  He has slight angulation of the finger itself.  No erythema or redness.   Lungs clear bilaterally  Heart S1 S2 no murmurs  Abdomen: Soft NT, NL bowel sounds     Cra Left       4         3       6+         0+ 4        Reflexes DTRs:      Biceps   Brachioradialis   Triceps    Patellar    Ankle  Hamstring   Right      2+           1+       1+        1+       1+     Left      1+           2+       1+        8+       2+

## 2017-10-02 NOTE — ANESTHESIA POSTPROCEDURE EVALUATION
[address] [place] [address] Patient Status:  Outpatient in a Bed   Age/Gender 63year old male MRN [id number]   Location [address] [facility] Attending Scott Flores MD   Hosp Day # 0 PCP JENN Espino       Anesthesia

## 2017-10-03 ENCOUNTER — APPOINTMENT (OUTPATIENT)
Dept: GENERAL RADIOLOGY | Facility: HOSPITAL | Age: 62
End: 2017-10-03
Attending: PHYSICIAN ASSISTANT
Payer: COMMERCIAL

## 2017-10-03 VITALS
BODY MASS INDEX: 35.01 KG/M2 | HEART RATE: 100 BPM | DIASTOLIC BLOOD PRESSURE: 99 MMHG | WEIGHT: 231 LBS | SYSTOLIC BLOOD PRESSURE: 139 MMHG | OXYGEN SATURATION: 92 % | RESPIRATION RATE: 18 BRPM | TEMPERATURE: 98 F | HEIGHT: 68 IN

## 2017-10-03 PROCEDURE — 99225 SUBSEQUENT OBSERVATION CARE: CPT | Performed by: HOSPITALIST

## 2017-10-03 PROCEDURE — 72040 X-RAY EXAM NECK SPINE 2-3 VW: CPT | Performed by: PHYSICIAN ASSISTANT

## 2017-10-03 RX ORDER — HYDROCODONE BITARTRATE AND ACETAMINOPHEN 10; 325 MG/1; MG/1
1-2 TABLET ORAL EVERY 6 HOURS PRN
Qty: 60 TABLET | Refills: 0 | Status: SHIPPED | OUTPATIENT
Start: 2017-10-03 | End: 2017-10-16

## 2017-10-03 NOTE — OCCUPATIONAL THERAPY NOTE
OCCUPATIONAL THERAPY QUICK EVALUATION - INPATIENT    Room Number: 376/376-A  Evaluation Date: 10/3/2017     Type of Evaluation: Quick Eval  Presenting Problem: s/p C5-6 ACDF    Physician Order: IP Consult to Occupational Therapy  Reason for Therapy:  ADL/I since surgery (left UE)    Patient self-stated goal is to get LUE strength back and LLE    OBJECTIVE  Precautions: Spine  Fall Risk: Standard fall risk    WEIGHT BEARING RESTRICTION  Weight Bearing Restriction: None     PAIN ASSESSMENT  Rating: 3  Location tolerate > 5 min in standing with no increased symptoms. Patient reports left le foot drop has improved since surgery. Patient End of Session: Up in chair;Needs met;Call light within reach;RN aware of session/findings; All patient questions and concer

## 2017-10-03 NOTE — PLAN OF CARE
Patient/Family Goals    • Patient/Family Long Term Goal Progressing    • Patient/Family Short Term Goal Progressing        Ambulated in the hallway with standby assist.denies numbness or tingling,footdrop improved. Dressing to ant.neck dry and intact,no dif

## 2017-10-03 NOTE — PLAN OF CARE
D/c paperwork given, questions answered and concerns addressed. Script for norco given, dressing changed. Will D/c home via personal car.

## 2017-10-03 NOTE — PROGRESS NOTES
[facility]  Progress Note     Hayes Stammer Patient Status:  Observation    1955 MRN [id number]   [facility] 3SW-A Attending Silviano Chen MD   Hosp Day # 0 PCP JENN Najera     Chief Complaint: elective C5-C6 dis BB  2. Dyslipidemia  1. statin  3. Anxiety  1.  Xanax PRN     Quality:  · DVT Prophylaxis: SCDS  · CODE status: full  · West: no     Plan of care discussed with patient and family    Prem Newman MD

## 2017-10-03 NOTE — PHYSICAL THERAPY NOTE
PHYSICAL THERAPY QUICK EVALUATION - INPATIENT    Room Number: 376/376-A  Evaluation Date: 10/3/2017  Presenting Problem: C5-C6 discectomy and artificial disc replacement  Physician Order: PT Eval and Treat    Problem List  Active Problems:    Cervical my adjusting bedclothes, sheets and blankets)?: None   -   Sitting down on and standing up from a chair with arms (e.g., wheelchair, bedside commode, etc.): None   -   Moving from lying on back to sitting on the side of the bed?: None   How much help from ano overall evaluation complexity is considered low. PT Discharge Recommendations: Home    PLAN  Patient has been evaluated and presents with no skilled Physical Therapy needs at this time. Patient discharged from Physical Therapy services.   Please re-order

## 2017-10-03 NOTE — PLAN OF CARE
Patient/Family Goals    • Patient/Family Long Term Goal Adequate for Discharge    • Patient/Family Short Term Goal Adequate for Discharge        Pt resting in chair.  Pain well controlled this morning, 4/10. 1 norco given this morning, and scripts at home f

## 2017-10-16 ENCOUNTER — OFFICE VISIT (OUTPATIENT)
Dept: SURGERY | Facility: CLINIC | Age: 62
End: 2017-10-16

## 2017-10-16 VITALS
HEIGHT: 68 IN | DIASTOLIC BLOOD PRESSURE: 88 MMHG | BODY MASS INDEX: 33.34 KG/M2 | RESPIRATION RATE: 16 BRPM | SYSTOLIC BLOOD PRESSURE: 150 MMHG | WEIGHT: 220 LBS | HEART RATE: 105 BPM

## 2017-10-16 DIAGNOSIS — M54.12 CERVICAL MYELOPATHY WITH CERVICAL RADICULOPATHY (HCC): Primary | ICD-10-CM

## 2017-10-16 DIAGNOSIS — G95.9 CERVICAL MYELOPATHY WITH CERVICAL RADICULOPATHY (HCC): Primary | ICD-10-CM

## 2017-10-16 DIAGNOSIS — M50.022 CERVICAL DISC DISORDER AT C5-C6 LEVEL WITH MYELOPATHY: ICD-10-CM

## 2017-10-16 PROCEDURE — 99024 POSTOP FOLLOW-UP VISIT: CPT | Performed by: PHYSICIAN ASSISTANT

## 2017-10-16 RX ORDER — CYCLOBENZAPRINE HCL 10 MG
10 TABLET ORAL 3 TIMES DAILY PRN
Qty: 90 TABLET | Refills: 2 | Status: SHIPPED | OUTPATIENT
Start: 2017-10-16 | End: 2018-01-11

## 2017-10-16 RX ORDER — METHYLPREDNISOLONE 4 MG/1
TABLET ORAL
Qty: 1 PACKAGE | Refills: 0 | Status: SHIPPED | OUTPATIENT
Start: 2017-10-16 | End: 2017-11-13 | Stop reason: ALTCHOICE

## 2017-10-16 RX ORDER — HYDROCODONE BITARTRATE AND ACETAMINOPHEN 10; 325 MG/1; MG/1
1 TABLET ORAL EVERY 8 HOURS PRN
Qty: 90 TABLET | Refills: 0 | Status: SHIPPED | OUTPATIENT
Start: 2017-10-16 | End: 2018-09-11

## 2017-10-16 NOTE — PATIENT INSTRUCTIONS
Refill policies:    • Allow 2-3 business days for refills; controlled substances may take longer.   • Contact your pharmacy at least 5 days prior to running out of medication and have them send an electronic request or submit request through the [facility] have a procedure or additional testing performed. [facility]) will contact your insurance carrier to obtain pre-certification or prior authorization.     Unfortunately, [facility] has seen an increase in denial of payment even though the p

## 2017-10-16 NOTE — PROGRESS NOTES
Neurosurgery follow up        C5-6 ADR Dr Toñito Harrison 10/2/17     Aileen Celaya is a 63year old [address] male  here in follow-up after surgery. His swallowing is improved later in the day he has little more difficulty swallowing.   His v was seen at [facility], referred to orthopedics Dr. Karla Otoole. He referred patient to a hand specialist Dr. Tiff Puckett. He was diagnosed with a left fourth digit contracture. It was recommended he have surgery to free it up.   He underwent surgery March 11, 2 the shoulder but not the radiation pain down the arm.     Dr. Ml Garner referred the patient to spine specialist June 21, 2017 to have a look at his neck.   Dr. Sage Bower completed the referral process.     Patient was placed on a Medrol Dosepak back in April of a jolt into his right thigh but denies any radiculopathy down the right leg he denies any numbness or tingling in the right leg he denies any weakness in the right leg. He denies any bowel or bladder incontinence. He denies night pain or night sweats. positives and negatives are noted in HPI.       PHYSICAL EXAMINATION:  GENERAL:  Patient is in no acute distress. HEENT:  Normocephalic, atraumatic  SKIN: Warm, dry, without rashes. Left fourth digit has a scar consistent with his surgery.   He has slight post left A2 pulley repair, revision A2 pulley repair, a 4 repair of the fourth finger     PLAN:  1. Follow up 4 weeks postop  2. Medication: Norco, flexeril, medrol  3. Work: Off work pending reevaluation  4. xr before next apt  5.  HEP       It appears fr

## 2017-11-09 ENCOUNTER — HOSPITAL ENCOUNTER (OUTPATIENT)
Dept: GENERAL RADIOLOGY | Age: 62
Discharge: HOME | End: 2017-11-09
Attending: PHYSICIAN ASSISTANT
Payer: COMMERCIAL

## 2017-11-09 DIAGNOSIS — M54.12 CERVICAL MYELOPATHY WITH CERVICAL RADICULOPATHY (HCC): ICD-10-CM

## 2017-11-09 DIAGNOSIS — M50.022 CERVICAL DISC DISORDER AT C5-C6 LEVEL WITH MYELOPATHY: ICD-10-CM

## 2017-11-09 DIAGNOSIS — G95.9 CERVICAL MYELOPATHY WITH CERVICAL RADICULOPATHY (HCC): ICD-10-CM

## 2017-11-09 PROCEDURE — 72040 X-RAY EXAM NECK SPINE 2-3 VW: CPT | Performed by: PHYSICIAN ASSISTANT

## 2017-11-13 ENCOUNTER — HOSPITAL ENCOUNTER (OUTPATIENT)
Dept: GENERAL RADIOLOGY | Facility: HOSPITAL | Age: 62
Discharge: HOME | End: 2017-11-13
Attending: PHYSICIAN ASSISTANT
Payer: COMMERCIAL

## 2017-11-13 ENCOUNTER — OFFICE VISIT (OUTPATIENT)
Dept: SURGERY | Facility: CLINIC | Age: 62
End: 2017-11-13

## 2017-11-13 VITALS — SYSTOLIC BLOOD PRESSURE: 150 MMHG | DIASTOLIC BLOOD PRESSURE: 84 MMHG | HEART RATE: 100 BPM | TEMPERATURE: 98 F

## 2017-11-13 DIAGNOSIS — G95.9 CERVICAL MYELOPATHY WITH CERVICAL RADICULOPATHY (HCC): ICD-10-CM

## 2017-11-13 DIAGNOSIS — G95.9 CERVICAL MYELOPATHY WITH CERVICAL RADICULOPATHY (HCC): Primary | ICD-10-CM

## 2017-11-13 DIAGNOSIS — M54.12 CERVICAL MYELOPATHY WITH CERVICAL RADICULOPATHY (HCC): Primary | ICD-10-CM

## 2017-11-13 DIAGNOSIS — M54.12 CERVICAL MYELOPATHY WITH CERVICAL RADICULOPATHY (HCC): ICD-10-CM

## 2017-11-13 DIAGNOSIS — M50.022 CERVICAL DISC DISORDER AT C5-C6 LEVEL WITH MYELOPATHY: ICD-10-CM

## 2017-11-13 PROCEDURE — 72040 X-RAY EXAM NECK SPINE 2-3 VW: CPT | Performed by: PHYSICIAN ASSISTANT

## 2017-11-13 PROCEDURE — 99024 POSTOP FOLLOW-UP VISIT: CPT | Performed by: PHYSICIAN ASSISTANT

## 2017-11-13 NOTE — PROGRESS NOTES
Neurosurgery follow up        C5-6 ADR Dr Jayme Rankin 10/2/17     Hector Ibarra is a 63year old [address] male here in follow-up after surgery. He has had a slight decline since his last visit. His neck pain is a 2/10.   He still gets pa better. Last Hx:  here for cervical pain left arm pain and weakness and left leg weakness.     Patient gives a history of being a  for [employer] where he was working at [employer] on or about 11/30/2015 in [place] [address].   Says he was The pain progressed into left bicipital throbbing and aching and burning. Then started to shoot down into his left second through fourth fingers. He was given a tentative diagnosis after his 2 week follow-up of thoracic outlet syndrome by Dr. Shawnee Aguilar. of pain down the left C6 and C7 dermatome going into the index middle and ring finger. He has pain into the left biceps. He gets numbness and tingling down the arm in the same distribution. It is a shooting pain through his left elbow.   He feels his lef Left      Comment: jean tendon repair  1982: OTHER Left      Comment: knee scope      FAMILY HISTORY:  family history is not on file.     SOCIAL HISTORY:   reports that he has quit smoking. He does not have any smokeless tobacco history on file.  He rep normal.  C5-6 prosthesis is healed to the implant well. There is a slight anterior listhesis of the upper plate relative to the lower plate on flexion.   Very mild kyphosis at C4-5 on flexion, C4-5 retrolisthesis on extension    XR CS 10/2/17    MRI cervic Apollo Morales, [address]   [phone]

## 2017-11-13 NOTE — PROGRESS NOTES
Pt is here to follow up after sx. Pain currently 2/10 improved since sx. Arm and leg symptoms still remain, however pain in neck has improved.

## 2017-11-13 NOTE — PATIENT INSTRUCTIONS
Refill policies:    • Allow 2-3 business days for refills; controlled substances may take longer.   • Contact your pharmacy at least 5 days prior to running out of medication and have them send an electronic request or submit request through the [facility] have a procedure or additional testing performed. [facility]) will contact your insurance carrier to obtain pre-certification or prior authorization.     Unfortunately, [facility] has seen an increase in denial of payment even though the p

## 2017-11-26 NOTE — OPERATIVE REPORT
[facility]    OPERATIVE REPORT    Patient:  Latanya Ibrahim;  YOB: 1955     CSN:  [id number]; Medical Record Number:  [id number]    Admission Date:  10/2/2017 Operation Date:  10/2/2017    . ..........................     Operating edil appropriate cervical levels for use of C-arm fluoroscopy. The C-arm was brought into position and appropriate anterior-posterior, and lateral images were obtained to pick the proper skin crease in the anterior neck for exposure of the C5-6 disc level. . The was carried down to the posterior longitudinal ligament level.  Posterior longitudinal ligament was opened down to the epidural space under operative magnification and with the dura clearly in view a variety of curettes, vertebral body right angled dissecto sparing the bony endplate while removing cartilaginous endplate to ensure an appropriate fit for the device. Anterior-posterior and lateral  fluoroscopy images were utilized as needed to ensure device would be seated properly in the interspace.   The arthro

## 2017-12-06 ENCOUNTER — TELEPHONE (OUTPATIENT)
Dept: SURGERY | Facility: CLINIC | Age: 62
End: 2017-12-06

## 2017-12-06 ENCOUNTER — MED REC SCAN ONLY (OUTPATIENT)
Dept: SURGERY | Facility: CLINIC | Age: 62
End: 2017-12-06

## 2017-12-29 ENCOUNTER — TELEPHONE (OUTPATIENT)
Dept: SURGERY | Facility: CLINIC | Age: 62
End: 2017-12-29

## 2018-01-01 ENCOUNTER — OFFICE VISIT (OUTPATIENT)
Dept: INTERNAL MEDICINE | Age: 63
End: 2018-01-01

## 2018-01-01 ENCOUNTER — CHARTING TRANS (OUTPATIENT)
Dept: OTHER | Age: 63
End: 2018-01-01

## 2018-01-01 ENCOUNTER — LAB SERVICES (OUTPATIENT)
Dept: OTHER | Age: 63
End: 2018-01-01

## 2018-01-01 ENCOUNTER — OFFICE VISIT (OUTPATIENT)
Dept: AUDIOLOGY | Age: 63
End: 2018-01-01

## 2018-01-01 ENCOUNTER — LAB SERVICES (OUTPATIENT)
Dept: LAB | Age: 63
End: 2018-01-01

## 2018-01-01 ENCOUNTER — IMAGING SERVICES (OUTPATIENT)
Dept: OTHER | Age: 63
End: 2018-01-01

## 2018-01-01 ENCOUNTER — HOSPITAL ENCOUNTER (OUTPATIENT)
Dept: GENERAL RADIOLOGY | Facility: HOSPITAL | Age: 63
Discharge: HOME | End: 2018-01-01
Attending: PHYSICIAN ASSISTANT
Payer: COMMERCIAL

## 2018-01-01 ENCOUNTER — HOSPITAL ENCOUNTER (OUTPATIENT)
Dept: CT IMAGING | Facility: HOSPITAL | Age: 63
Discharge: HOME | End: 2018-01-01
Attending: PHYSICIAN ASSISTANT
Payer: COMMERCIAL

## 2018-01-01 ENCOUNTER — APPOINTMENT (OUTPATIENT)
Dept: LAB | Age: 63
End: 2018-01-01

## 2018-01-01 ENCOUNTER — PROCEDURE VISIT (OUTPATIENT)
Dept: NEUROLOGY | Facility: CLINIC | Age: 63
End: 2018-01-01
Payer: COMMERCIAL

## 2018-01-01 VITALS
WEIGHT: 231 LBS | HEIGHT: 68 IN | BODY MASS INDEX: 35.01 KG/M2 | HEART RATE: 80 BPM | OXYGEN SATURATION: 98 % | DIASTOLIC BLOOD PRESSURE: 82 MMHG | SYSTOLIC BLOOD PRESSURE: 148 MMHG | TEMPERATURE: 96.5 F

## 2018-01-01 VITALS
HEIGHT: 68 IN | WEIGHT: 224 LBS | BODY MASS INDEX: 33.95 KG/M2 | OXYGEN SATURATION: 97 % | SYSTOLIC BLOOD PRESSURE: 148 MMHG | TEMPERATURE: 97.5 F | HEART RATE: 87 BPM | DIASTOLIC BLOOD PRESSURE: 88 MMHG

## 2018-01-01 VITALS
DIASTOLIC BLOOD PRESSURE: 82 MMHG | WEIGHT: 238 LBS | TEMPERATURE: 97.9 F | BODY MASS INDEX: 36.07 KG/M2 | HEIGHT: 68 IN | HEART RATE: 76 BPM | SYSTOLIC BLOOD PRESSURE: 138 MMHG

## 2018-01-01 VITALS
HEART RATE: 82 BPM | SYSTOLIC BLOOD PRESSURE: 134 MMHG | DIASTOLIC BLOOD PRESSURE: 82 MMHG | WEIGHT: 228 LBS | OXYGEN SATURATION: 97 % | HEIGHT: 68 IN | TEMPERATURE: 96.4 F | BODY MASS INDEX: 34.56 KG/M2

## 2018-01-01 VITALS — BODY MASS INDEX: 37.13 KG/M2 | HEIGHT: 68 IN | WEIGHT: 245 LBS

## 2018-01-01 VITALS
DIASTOLIC BLOOD PRESSURE: 90 MMHG | HEART RATE: 93 BPM | WEIGHT: 226 LBS | BODY MASS INDEX: 34.25 KG/M2 | HEIGHT: 68 IN | SYSTOLIC BLOOD PRESSURE: 146 MMHG | TEMPERATURE: 97.3 F | OXYGEN SATURATION: 97 %

## 2018-01-01 VITALS — DIASTOLIC BLOOD PRESSURE: 86 MMHG | SYSTOLIC BLOOD PRESSURE: 151 MMHG | HEART RATE: 88 BPM

## 2018-01-01 DIAGNOSIS — E66.9 OBESITY (BMI 30-39.9): ICD-10-CM

## 2018-01-01 DIAGNOSIS — Z12.5 SCREENING FOR PROSTATE CANCER: ICD-10-CM

## 2018-01-01 DIAGNOSIS — E78.2 MIXED HYPERLIPIDEMIA: ICD-10-CM

## 2018-01-01 DIAGNOSIS — M47.22 CERVICAL SPONDYLOSIS WITH RADICULOPATHY: ICD-10-CM

## 2018-01-01 DIAGNOSIS — Z12.11 SCREEN FOR COLON CANCER: ICD-10-CM

## 2018-01-01 DIAGNOSIS — H90.3 SENSORINEURAL HEARING LOSS, ASYMMETRICAL: Primary | ICD-10-CM

## 2018-01-01 DIAGNOSIS — G95.9 CERVICAL MYELOPATHY WITH CERVICAL RADICULOPATHY (HCC): ICD-10-CM

## 2018-01-01 DIAGNOSIS — I10 ESSENTIAL HYPERTENSION: ICD-10-CM

## 2018-01-01 DIAGNOSIS — M54.12 CERVICAL MYELOPATHY WITH CERVICAL RADICULOPATHY (HCC): ICD-10-CM

## 2018-01-01 DIAGNOSIS — R26.9 GAIT DISORDER: ICD-10-CM

## 2018-01-01 DIAGNOSIS — R29.898 WEAKNESS OF LEFT LEG: ICD-10-CM

## 2018-01-01 DIAGNOSIS — K42.9 UMBILICAL HERNIA WITHOUT OBSTRUCTION AND WITHOUT GANGRENE: ICD-10-CM

## 2018-01-01 DIAGNOSIS — R29.898 WEAKNESS OF BOTH ARMS: ICD-10-CM

## 2018-01-01 DIAGNOSIS — Z00.00 ROUTINE GENERAL MEDICAL EXAMINATION AT A HEALTH CARE FACILITY: Primary | ICD-10-CM

## 2018-01-01 DIAGNOSIS — K43.9 VENTRAL HERNIA WITHOUT OBSTRUCTION OR GANGRENE: ICD-10-CM

## 2018-01-01 DIAGNOSIS — Z00.00 ROUTINE GENERAL MEDICAL EXAMINATION AT A HEALTH CARE FACILITY: ICD-10-CM

## 2018-01-01 LAB
ALBUMIN SERPL BCG-MCNC: 4.5 G/DL (ref 3.6–5.1)
ALP SERPL-CCNC: 114 U/L (ref 45–115)
ALT SERPL W/O P-5'-P-CCNC: 16 U/L (ref 10–35)
AP REPORT: NORMAL
AST SERPL-CCNC: 12 U/L (ref 9–37)
BILIRUB SERPL-MCNC: 0.4 MG/DL (ref 0–1)
BUN SERPL-MCNC: 12 MG/DL (ref 6–27)
CALCIUM SERPL-MCNC: 8.9 MG/DL (ref 8.6–10.6)
CHLORIDE SERPL-SCNC: 103 MMOL/L (ref 96–107)
CHOLEST SERPL-MCNC: 113 MG/DL (ref 140–200)
CREAT SERPL-MCNC: 1 MG/DL (ref 0.6–1.6)
DIFFERENTIAL TYPE: NORMAL
GFR SERPL CREATININE-BSD FRML MDRD: >60 ML/MIN/{1.73M2}
GFR SERPL CREATININE-BSD FRML MDRD: >60 ML/MIN/{1.73M2}
GLUCOSE P FAST SERPL-MCNC: 111 MG/DL (ref 60–100)
HCO3 SERPL-SCNC: 25 MMOL/L (ref 22–32)
HDLC SERPL-MCNC: 35 MG/DL
HEMATOCRIT: 46.8 % (ref 40–51)
HEMOCCULT STL QL IA: NEGATIVE
HEMOGLOBIN: 15.9 G/DL (ref 13.7–17.5)
LDLC SERPL CALC-MCNC: 64 MG/DL (ref 0–100)
LYMPH PERCENT: 31.5 % (ref 20.5–51.1)
LYMPHOCYTE ABSOLUTE #: 1.5 10*3/UL (ref 1.2–3.4)
MEAN CORPUSCULAR HGB CONCENTRATION: 34 % (ref 32–36)
MEAN CORPUSCULAR HGB: 29.2 PG (ref 27–34)
MEAN CORPUSCULAR VOLUME: 86 FL (ref 79–95)
MEAN PLATELET VOLUME: 10 FL (ref 8.6–12.4)
MIXED %: 9.9 % (ref 4.3–12.9)
MIXED ABSOLUTE #: 0.5 10*3/UL (ref 0.2–0.9)
NEUTROPHIL ABSOLUTE #: 2.9 10*3/UL (ref 1.4–6.5)
NEUTROPHIL PERCENT: 58.6 % (ref 34–73.5)
PLATELET COUNT: 221 10*3/UL (ref 150–400)
POTASSIUM SERPL-SCNC: 4.3 MMOL/L (ref 3.5–5.3)
PROT SERPL-MCNC: 6.8 G/DL (ref 6.2–8.1)
PSA SERPL-MCNC: 0.52 NG/ML (ref 0–3.8)
RED BLOOD CELL COUNT: 5.44 10*6/UL (ref 3.9–5.7)
RED CELL DISTRIBUTION WIDTH: 12.7 % (ref 11.3–14.8)
SODIUM SERPL-SCNC: 137 MMOL/L (ref 136–146)
TRIGL SERPL-MCNC: 73 MG/DL (ref 0–200)
WHITE BLOOD CELL COUNT: 4.9 10*3/UL (ref 4–10)

## 2018-01-01 PROCEDURE — 62302 MYELOGRAPHY LUMBAR INJECTION: CPT | Performed by: PHYSICIAN ASSISTANT

## 2018-01-01 PROCEDURE — X1094 NO CHARGE VISIT: HCPCS | Performed by: AUDIOLOGIST

## 2018-01-01 PROCEDURE — 85025 COMPLETE CBC W/AUTO DIFF WBC: CPT | Performed by: INTERNAL MEDICINE

## 2018-01-01 PROCEDURE — 80061 LIPID PANEL: CPT | Performed by: INTERNAL MEDICINE

## 2018-01-01 PROCEDURE — 95911 NRV CNDJ TEST 9-10 STUDIES: CPT | Performed by: OTHER

## 2018-01-01 PROCEDURE — 95886 MUSC TEST DONE W/N TEST COMP: CPT | Performed by: OTHER

## 2018-01-01 PROCEDURE — 80053 COMPREHEN METABOLIC PANEL: CPT | Performed by: INTERNAL MEDICINE

## 2018-01-01 PROCEDURE — G0402 INITIAL PREVENTIVE EXAM: HCPCS | Performed by: INTERNAL MEDICINE

## 2018-01-01 PROCEDURE — 72126 CT NECK SPINE W/DYE: CPT | Performed by: PHYSICIAN ASSISTANT

## 2018-01-01 PROCEDURE — G0103 PSA SCREENING: HCPCS | Performed by: INTERNAL MEDICINE

## 2018-01-01 PROCEDURE — 36415 COLL VENOUS BLD VENIPUNCTURE: CPT | Performed by: INTERNAL MEDICINE

## 2018-01-01 PROCEDURE — G0328 FECAL BLOOD SCRN IMMUNOASSAY: HCPCS | Performed by: INTERNAL MEDICINE

## 2018-01-01 RX ORDER — LIDOCAINE HYDROCHLORIDE 10 MG/ML
10 INJECTION, SOLUTION EPIDURAL; INFILTRATION; INTRACAUDAL; PERINEURAL ONCE
Status: COMPLETED | OUTPATIENT
Start: 2018-01-01 | End: 2018-01-01

## 2018-01-01 RX ORDER — AMLODIPINE BESYLATE 5 MG/1
1 TABLET ORAL 2 TIMES DAILY
COMMUNITY
Start: 2017-05-17 | End: 2019-01-01 | Stop reason: SDUPTHER

## 2018-01-01 RX ORDER — NIACIN 500 MG/1
500 TABLET, EXTENDED RELEASE ORAL NIGHTLY
COMMUNITY
Start: 2018-09-06 | End: 2019-01-01 | Stop reason: ALTCHOICE

## 2018-01-01 RX ORDER — FLUTICASONE PROPIONATE 50 MCG
1 SPRAY, SUSPENSION (ML) NASAL DAILY
COMMUNITY

## 2018-01-01 RX ORDER — METOPROLOL SUCCINATE 25 MG/1
1 TABLET, EXTENDED RELEASE ORAL DAILY
COMMUNITY
Start: 2017-06-22 | End: 2019-01-01 | Stop reason: SDUPTHER

## 2018-01-01 RX ORDER — SIMVASTATIN 20 MG
1 TABLET ORAL NIGHTLY
COMMUNITY
Start: 2017-06-22 | End: 2019-01-01 | Stop reason: SDUPTHER

## 2018-01-01 RX ORDER — LIDOCAINE HYDROCHLORIDE 10 MG/ML
INJECTION, SOLUTION EPIDURAL; INFILTRATION; INTRACAUDAL; PERINEURAL
Status: COMPLETED
Start: 2018-01-01 | End: 2018-01-01

## 2018-01-01 RX ADMIN — LIDOCAINE HYDROCHLORIDE 10 ML: 10 INJECTION, SOLUTION EPIDURAL; INFILTRATION; INTRACAUDAL; PERINEURAL at 11:30:00

## 2018-01-01 ASSESSMENT — PATIENT HEALTH QUESTIONNAIRE - PHQ9
SUM OF ALL RESPONSES TO PHQ9 QUESTIONS 1 AND 2: 0
1. LITTLE INTEREST OR PLEASURE IN DOING THINGS: NOT AT ALL
2. FEELING DOWN, DEPRESSED OR HOPELESS: NOT AT ALL

## 2018-01-01 ASSESSMENT — ENCOUNTER SYMPTOMS
VOMITING: 0
ABDOMINAL PAIN: 0
SINUS PAIN: 0
FATIGUE: 0
CHILLS: 0
RHINORRHEA: 0
BLOOD IN STOOL: 0
CHEST TIGHTNESS: 0
NUMBNESS: 0
APPETITE CHANGE: 0
SORE THROAT: 0
DIAPHORESIS: 0
NERVOUS/ANXIOUS: 0
COUGH: 0
NAUSEA: 0
FEVER: 0
ADENOPATHY: 0
HEADACHES: 0
CONSTIPATION: 0
ACTIVITY CHANGE: 1
SLEEP DISTURBANCE: 0
DIZZINESS: 0
DIARRHEA: 0
WHEEZING: 0
SHORTNESS OF BREATH: 0
EYE DISCHARGE: 0
BACK PAIN: 0

## 2018-01-11 ENCOUNTER — HOSPITAL ENCOUNTER (OUTPATIENT)
Dept: GENERAL RADIOLOGY | Facility: HOSPITAL | Age: 63
Discharge: HOME | End: 2018-01-11
Attending: PHYSICIAN ASSISTANT
Payer: COMMERCIAL

## 2018-01-11 ENCOUNTER — OFFICE VISIT (OUTPATIENT)
Dept: SURGERY | Facility: CLINIC | Age: 63
End: 2018-01-11

## 2018-01-11 VITALS
DIASTOLIC BLOOD PRESSURE: 102 MMHG | RESPIRATION RATE: 18 BRPM | HEART RATE: 100 BPM | HEIGHT: 68 IN | WEIGHT: 225 LBS | BODY MASS INDEX: 34.1 KG/M2 | SYSTOLIC BLOOD PRESSURE: 182 MMHG

## 2018-01-11 DIAGNOSIS — M50.022 CERVICAL DISC DISORDER AT C5-C6 LEVEL WITH MYELOPATHY: ICD-10-CM

## 2018-01-11 DIAGNOSIS — G95.9 CERVICAL MYELOPATHY WITH CERVICAL RADICULOPATHY (HCC): Primary | ICD-10-CM

## 2018-01-11 DIAGNOSIS — M54.12 CERVICAL MYELOPATHY WITH CERVICAL RADICULOPATHY (HCC): Primary | ICD-10-CM

## 2018-01-11 DIAGNOSIS — G95.9 CERVICAL MYELOPATHY WITH CERVICAL RADICULOPATHY (HCC): ICD-10-CM

## 2018-01-11 DIAGNOSIS — M25.312 SHOULDER INSTABILITY, LEFT: ICD-10-CM

## 2018-01-11 DIAGNOSIS — M54.12 CERVICAL MYELOPATHY WITH CERVICAL RADICULOPATHY (HCC): ICD-10-CM

## 2018-01-11 PROCEDURE — 72050 X-RAY EXAM NECK SPINE 4/5VWS: CPT | Performed by: PHYSICIAN ASSISTANT

## 2018-01-11 PROCEDURE — 99213 OFFICE O/P EST LOW 20 MIN: CPT | Performed by: PHYSICIAN ASSISTANT

## 2018-01-11 RX ORDER — HYDROCODONE BITARTRATE AND ACETAMINOPHEN 10; 325 MG/1; MG/1
1 TABLET ORAL EVERY 8 HOURS PRN
Qty: 90 TABLET | Refills: 0 | Status: CANCELLED | OUTPATIENT
Start: 2018-01-11

## 2018-01-11 RX ORDER — HYDROCODONE BITARTRATE AND ACETAMINOPHEN 10; 325 MG/1; MG/1
2 TABLET ORAL EVERY 4 HOURS PRN
Qty: 90 TABLET | Refills: 0 | Status: SHIPPED | OUTPATIENT
Start: 2018-01-11 | End: 2018-09-11

## 2018-01-11 RX ORDER — CYCLOBENZAPRINE HCL 10 MG
10 TABLET ORAL 3 TIMES DAILY PRN
Qty: 90 TABLET | Refills: 2 | Status: SHIPPED | OUTPATIENT
Start: 2018-01-11 | End: 2018-07-10

## 2018-01-11 NOTE — PATIENT INSTRUCTIONS
Refill policies:    • Allow 2-3 business days for refills; controlled substances may take longer.   • Contact your pharmacy at least 5 days prior to running out of medication and have them send an electronic request or submit request through the [facility] recommended that you have a procedure or additional testing performed. [facility]) will contact your insurance carrier to obtain pre-certification or prior authorization.     Unfortunately, [facility] has seen an increase in denial of paym

## 2018-01-11 NOTE — PROGRESS NOTES
Patient stated that left foot drop is gone and is working. However, patient is having left arm is still painful to lift and numbness from the shoulder to elbow and the knuckle on the ring finger.

## 2018-01-12 NOTE — PROGRESS NOTES
Neurosurgery follow up        C5-6 ADR Dr Milton Padilla 10/2/17     Ramez Riley is a 63year old [address] male here in follow-up. States his neck pain is much better.   He still having left focal shoulder pain and shooting pain down the l prolonged distance more than a few blocks. His left leg is getting weaker and more spastic. Feels like the pain into his left shoulder is getting more intense. Last hx:  here in follow up after MRI CS and LS spine.  He continues with left cervical ra disability in his left fourth finger.     He underwent surgery on November 8, 2016 to repair the A2 and A4 pulleys. He had a Darren block without complication.   After the surgery he felt a difference in his left hand he had sharp stabbing pain in his left h occasional burning across his upper thoracic spine he gets pain into both scapular the left is much worse in the right it is a sharp pain he gets tingling down his back when he moves his neck.   He complains of lower back stiffness and tightness and low christiane headache or other focal neurological complaints. He does have intermittent tinnitus in the right ear he has natural hearing loss due to his job.            PAST MEDICAL HISTORY:       Past Medical History:   Diagnosis Date   • Essential hypertension     • 5          5 5 4+ 4 5 4+   Left         5       5         5-          5- 5 4+ 4 5 4+      Lower extremity strength: Left hip flexion is 5/5 with cervical extension 4+/5 on flexion     Iliopsoas  Hamstrings   Quads    D-flexion P-flexion Eversion   Right patient and is in agreement.       DAYLIN Echavarria M.S., JULIO COLMENARES [facility]  [address] [facility], [facility] Omar Quinonez, [address] [facility]  [phone]

## 2018-02-08 ENCOUNTER — HOSPITAL ENCOUNTER (OUTPATIENT)
Dept: MRI IMAGING | Facility: HOSPITAL | Age: 63
Discharge: HOME | End: 2018-02-08
Attending: ORTHOPAEDIC SURGERY
Payer: COMMERCIAL

## 2018-02-08 ENCOUNTER — HOSPITAL ENCOUNTER (OUTPATIENT)
Dept: GENERAL RADIOLOGY | Facility: HOSPITAL | Age: 63
Discharge: HOME | End: 2018-02-08
Attending: ORTHOPAEDIC SURGERY
Payer: COMMERCIAL

## 2018-02-08 DIAGNOSIS — M25.512 PAIN IN LEFT SHOULDER: ICD-10-CM

## 2018-02-08 PROCEDURE — A9575 INJ GADOTERATE MEGLUMI 0.1ML: HCPCS | Performed by: ORTHOPAEDIC SURGERY

## 2018-02-08 PROCEDURE — 73222 MRI JOINT UPR EXTREM W/DYE: CPT | Performed by: ORTHOPAEDIC SURGERY

## 2018-02-08 PROCEDURE — 77002 NEEDLE LOCALIZATION BY XRAY: CPT | Performed by: ORTHOPAEDIC SURGERY

## 2018-02-08 PROCEDURE — 23350 INJECTION FOR SHOULDER X-RAY: CPT | Performed by: ORTHOPAEDIC SURGERY

## 2018-02-12 ENCOUNTER — TELEPHONE (OUTPATIENT)
Dept: SURGERY | Facility: CLINIC | Age: 63
End: 2018-02-12

## 2018-03-08 ENCOUNTER — CHARTING TRANS (OUTPATIENT)
Dept: OTHER | Age: 63
End: 2018-03-08

## 2018-03-08 ENCOUNTER — LAB SERVICES (OUTPATIENT)
Dept: OTHER | Age: 63
End: 2018-03-08

## 2018-03-08 ENCOUNTER — IMAGING SERVICES (OUTPATIENT)
Dept: OTHER | Age: 63
End: 2018-03-08

## 2018-03-08 LAB
ALBUMIN SERPL BCG-MCNC: 4.6 G/DL (ref 3.6–5.1)
ALP SERPL-CCNC: 113 U/L (ref 30–130)
ALT SERPL W/O P-5'-P-CCNC: 26 U/L (ref 10–35)
AST SERPL-CCNC: 16 U/L (ref 9–37)
BILIRUB SERPL-MCNC: 0.5 MG/DL (ref 0–1)
BILIRUBIN URINE: NEGATIVE
BLOOD URINE: ABNORMAL
BUN SERPL-MCNC: 16 MG/DL (ref 6–27)
CALCIUM SERPL-MCNC: 9.5 MG/DL (ref 8.6–10.6)
CHLORIDE SERPL-SCNC: 103 MMOL/L (ref 96–107)
CHOLEST SERPL-MCNC: 148 MG/DL (ref 140–200)
CLARITY: CLEAR
COLOR: YELLOW
CREATININE, SERUM: 0.9 MG/DL (ref 0.6–1.6)
DIFFERENTIAL TYPE: NORMAL
GFR SERPL CREATININE-BSD FRML MDRD: >60 ML/MIN/{1.73M2}
GFR SERPL CREATININE-BSD FRML MDRD: >60 ML/MIN/{1.73M2}
GLUCOSE P FAST SERPL-MCNC: 121 MG/DL (ref 60–100)
GLUCOSE QUALITATIVE U: NEGATIVE
HCO3 SERPL-SCNC: 31 MMOL/L (ref 22–32)
HDLC SERPL-MCNC: 41 MG/DL
HEMATOCRIT: 47.3 % (ref 40–51)
HEMOGLOBIN: 16.3 G/DL (ref 13.7–17.5)
INTERNATIONAL NORMALIZED RATIO (COAGUCHEK): 1.1 (ref 1.8–3.5)
INTERNATIONAL NORMALIZED RATIO: 3.3
KETONES, URINE: NEGATIVE
LDLC SERPL CALC-MCNC: 85 MG/DL (ref 0–100)
LEUKOCYTE ESTERASE URINE: NEGATIVE
LYMPH PERCENT: 28.2 % (ref 20.5–51.1)
LYMPHOCYTE ABSOLUTE #: 1.7 10*3/UL (ref 1.2–3.4)
MEAN CORPUSCULAR HGB CONCENTRATION: 34.5 % (ref 32–36)
MEAN CORPUSCULAR HGB: 29.3 PG (ref 27–34)
MEAN CORPUSCULAR VOLUME: 85.1 FL (ref 79–95)
MEAN PLATELET VOLUME: 9.7 FL (ref 8.6–12.4)
MIXED %: 10.6 % (ref 4.3–12.9)
MIXED ABSOLUTE #: 0.7 10*3/UL (ref 0.2–0.9)
NEUTROPHIL ABSOLUTE #: 3.8 10*3/UL (ref 1.4–6.5)
NEUTROPHIL PERCENT: 61.2 % (ref 34–73.5)
NITRITE URINE: NEGATIVE
PH URINE: 5.5 (ref 5–7)
PLATELET COUNT: 283 10*3/UL (ref 150–400)
POTASSIUM SERPL-SCNC: 4.3 MMOL/L (ref 3.5–5.3)
PROT SERPL-MCNC: 7 G/DL (ref 6.2–8.1)
PROTHROMBIN TIME: 32.4 S (ref 9.5–11.5)
PTT: 27.1 S (ref 24–38)
RED BLOOD CELL COUNT: 5.56 10*6/UL (ref 3.9–5.7)
RED CELL DISTRIBUTION WIDTH: 13.1 % (ref 11.3–14.8)
SODIUM SERPL-SCNC: 142 MMOL/L (ref 136–146)
SPECIFIC GRAVITY URINE: 1.02 (ref 1–1.03)
TRIGL SERPL-MCNC: 114 MG/DL (ref 0–200)
URINE PROTEIN, QUAL (DIPSTICK): NEGATIVE
UROBILINOGEN URINE: 0.2
WHITE BLOOD CELL COUNT: 6.2 10*3/UL (ref 4–10)

## 2018-03-09 ENCOUNTER — ANCILLARY ORDERS (OUTPATIENT)
Dept: OTHER | Age: 63
End: 2018-03-09

## 2018-03-09 ENCOUNTER — LAB SERVICES (OUTPATIENT)
Dept: OTHER | Age: 63
End: 2018-03-09

## 2018-03-09 DIAGNOSIS — Z01.818 ENCOUNTER FOR OTHER PREPROCEDURAL EXAMINATION: ICD-10-CM

## 2018-03-09 LAB
INTERNATIONAL NORMALIZED RATIO: 1
PROTHROMBIN TIME: 10 S (ref 9.5–11.5)

## 2018-03-12 ENCOUNTER — CHARTING TRANS (OUTPATIENT)
Dept: OTHER | Age: 63
End: 2018-03-12

## 2018-03-12 ENCOUNTER — TELEPHONE (OUTPATIENT)
Dept: SURGERY | Facility: CLINIC | Age: 63
End: 2018-03-12

## 2018-03-19 ENCOUNTER — CHARTING TRANS (OUTPATIENT)
Dept: OTHER | Age: 63
End: 2018-03-19

## 2018-04-30 ENCOUNTER — TELEPHONE (OUTPATIENT)
Dept: SURGERY | Facility: CLINIC | Age: 63
End: 2018-04-30

## 2018-04-30 NOTE — TELEPHONE ENCOUNTER
Subpoena & $25.00 check received requesting any & all records; original request & check sent to [employer], copy of request & check sent to scanning

## 2018-07-10 RX ORDER — CYCLOBENZAPRINE HCL 10 MG
TABLET ORAL
Qty: 90 TABLET | Refills: 2 | Status: SHIPPED | OUTPATIENT
Start: 2018-07-10

## 2018-07-10 NOTE — TELEPHONE ENCOUNTER
Medication: Cyclobenzaprine 10 MG     Date of last refill: 90 Tabs 2 Refills   [place]     Last office visit: 1/11/18  Due back to clinic per last office note:  6 to 8 Weeks   Date next office visit scheduled:  No off

## 2018-09-06 ENCOUNTER — CHARTING TRANS (OUTPATIENT)
Dept: OTHER | Age: 63
End: 2018-09-06

## 2018-09-06 ENCOUNTER — LAB SERVICES (OUTPATIENT)
Dept: OTHER | Age: 63
End: 2018-09-06

## 2018-09-06 ENCOUNTER — HOSPITAL ENCOUNTER (OUTPATIENT)
Dept: GENERAL RADIOLOGY | Facility: HOSPITAL | Age: 63
Discharge: HOME | End: 2018-09-06
Attending: PHYSICIAN ASSISTANT
Payer: COMMERCIAL

## 2018-09-06 ENCOUNTER — TELEPHONE (OUTPATIENT)
Dept: SURGERY | Facility: CLINIC | Age: 63
End: 2018-09-06

## 2018-09-06 DIAGNOSIS — M47.22 CERVICAL SPONDYLOSIS WITH RADICULOPATHY: Primary | ICD-10-CM

## 2018-09-06 DIAGNOSIS — M47.22 CERVICAL SPONDYLOSIS WITH RADICULOPATHY: ICD-10-CM

## 2018-09-06 LAB
ALBUMIN SERPL BCG-MCNC: 4.4 G/DL (ref 3.6–5.1)
ALP SERPL-CCNC: 113 U/L (ref 45–115)
ALT SERPL W/O P-5'-P-CCNC: 53 U/L (ref 10–35)
AST SERPL-CCNC: 27 U/L (ref 9–37)
BILIRUB SERPL-MCNC: 0.5 MG/DL (ref 0–1)
BUN SERPL-MCNC: 13 MG/DL (ref 6–27)
CALCIUM SERPL-MCNC: 9.2 MG/DL (ref 8.6–10.6)
CHLORIDE SERPL-SCNC: 105 MMOL/L (ref 96–107)
CHOLEST SERPL-MCNC: 162 MG/DL (ref 140–200)
CREAT SERPL-MCNC: 0.9 MG/DL (ref 0.6–1.6)
EST. AVERAGE GLUCOSE BLD GHB EST-MCNC: 111 MG/DL (ref 0–154)
GFR SERPL CREATININE-BSD FRML MDRD: >60 ML/MIN/{1.73M2}
GFR SERPL CREATININE-BSD FRML MDRD: >60 ML/MIN/{1.73M2}
GLUCOSE P FAST SERPL-MCNC: 122 MG/DL (ref 60–100)
HBA1C BLD-MCNC: 5.5 % (ref 4.2–6)
HCO3 SERPL-SCNC: 27 MMOL/L (ref 22–32)
HDLC SERPL-MCNC: 40 MG/DL
LDLC SERPL CALC-MCNC: 99 MG/DL (ref 0–100)
POTASSIUM SERPL-SCNC: 4.4 MMOL/L (ref 3.5–5.3)
PROT SERPL-MCNC: 6.6 G/DL (ref 6.2–8.1)
SODIUM SERPL-SCNC: 142 MMOL/L (ref 136–146)
TRIGL SERPL-MCNC: 112 MG/DL (ref 0–200)

## 2018-09-06 PROCEDURE — 72050 X-RAY EXAM NECK SPINE 4/5VWS: CPT | Performed by: PHYSICIAN ASSISTANT

## 2018-09-07 ENCOUNTER — CHARTING TRANS (OUTPATIENT)
Dept: OTHER | Age: 63
End: 2018-09-07

## 2018-09-11 ENCOUNTER — HOSPITAL ENCOUNTER (OUTPATIENT)
Dept: GENERAL RADIOLOGY | Facility: HOSPITAL | Age: 63
Discharge: HOME | End: 2018-09-11
Attending: PHYSICIAN ASSISTANT
Payer: COMMERCIAL

## 2018-09-11 ENCOUNTER — TELEPHONE (OUTPATIENT)
Dept: SURGERY | Facility: CLINIC | Age: 63
End: 2018-09-11

## 2018-09-11 ENCOUNTER — OFFICE VISIT (OUTPATIENT)
Dept: SURGERY | Facility: CLINIC | Age: 63
End: 2018-09-11
Payer: COMMERCIAL

## 2018-09-11 VITALS — SYSTOLIC BLOOD PRESSURE: 160 MMHG | HEART RATE: 100 BPM | DIASTOLIC BLOOD PRESSURE: 98 MMHG

## 2018-09-11 DIAGNOSIS — R29.898 WEAKNESS OF LEFT LEG: ICD-10-CM

## 2018-09-11 DIAGNOSIS — M54.12 CERVICAL MYELOPATHY WITH CERVICAL RADICULOPATHY (HCC): ICD-10-CM

## 2018-09-11 DIAGNOSIS — M47.22 CERVICAL RADICULOPATHY DUE TO DEGENERATIVE JOINT DISEASE OF SPINE: ICD-10-CM

## 2018-09-11 DIAGNOSIS — R29.898 WEAKNESS OF BOTH ARMS: ICD-10-CM

## 2018-09-11 DIAGNOSIS — M47.22 CERVICAL SPONDYLOSIS WITH RADICULOPATHY: Primary | ICD-10-CM

## 2018-09-11 DIAGNOSIS — R26.9 GAIT DISORDER: ICD-10-CM

## 2018-09-11 DIAGNOSIS — G95.9 CERVICAL MYELOPATHY WITH CERVICAL RADICULOPATHY (HCC): ICD-10-CM

## 2018-09-11 PROCEDURE — 99213 OFFICE O/P EST LOW 20 MIN: CPT | Performed by: PHYSICIAN ASSISTANT

## 2018-09-11 PROCEDURE — 72040 X-RAY EXAM NECK SPINE 2-3 VW: CPT | Performed by: PHYSICIAN ASSISTANT

## 2018-09-11 RX ORDER — DIAZEPAM 5 MG/1
5 TABLET ORAL 2 TIMES DAILY PRN
Qty: 2 TABLET | Refills: 0 | Status: SHIPPED | OUTPATIENT
Start: 2018-09-11 | End: 2018-10-04 | Stop reason: ALTCHOICE

## 2018-09-11 RX ORDER — HYDROCODONE BITARTRATE AND ACETAMINOPHEN 10; 325 MG/1; MG/1
1 TABLET ORAL EVERY 4 HOURS PRN
Qty: 90 TABLET | Refills: 0 | Status: SHIPPED | OUTPATIENT
Start: 2018-09-11 | End: 2019-01-01 | Stop reason: ALTCHOICE

## 2018-09-11 RX ORDER — NIACIN 500 MG/1
500 TABLET, EXTENDED RELEASE ORAL NIGHTLY
COMMUNITY
Start: 2018-09-06

## 2018-09-11 RX ORDER — METHYLPREDNISOLONE 4 MG/1
TABLET ORAL
Qty: 1 PACKAGE | Refills: 0 | Status: SHIPPED | OUTPATIENT
Start: 2018-09-11 | End: 2018-10-04 | Stop reason: ALTCHOICE

## 2018-09-11 NOTE — PROGRESS NOTES
Neurosurgery follow up        1. C5-6 ADR Dr [facility] 10/2/17  2. Left shoulder surgery March 2018 Dr. Karen Borjas is a 63year old  male here in follow-up. Since his last visit he feels like he is getting worse. gives him most of his neck pain. He is taking Norco mostly at night Flexeril twice a day. His ability to walk and difficulty using the left leg especially climbing up hills of slight incline has gotten worse.   Overall he was better after surgery he has r was placed in work conditioning program.     September 2016 he saw Dr. Julito Pugh for his left finger disability.   At that time he had no neck pain no arm pain no numbness or tingling or weakness he had focal disability in his left fourth finger.     He unde He does not have any smokeless tobacco history on file. He reports that he drinks about 1.2 oz of alcohol per week .     ALLERGIES:     Penicillins             Hives     MEDICATIONS:     No current outpatient prescriptions on file prior to visit.   No curre bridging. He has spondylosis at C4-5 and C6-7      X-ray of the cervical spine 7/13/17. Report was read as normal.  C5-6 prosthesis is healed to the implant well.   There is a slight anterior listhesis of the upper plate relative to the lower plate on fle

## 2018-09-11 NOTE — TELEPHONE ENCOUNTER
Spoke with Sonia Motley- radiology and informed her xray for today is for flexion and extension. No further questions at this time.

## 2018-09-11 NOTE — PATIENT INSTRUCTIONS
Refill policies:    • Allow 2-3 business days for refills; controlled substances may take longer.   • Contact your pharmacy at least 5 days prior to running out of medication and have them send an electronic request or submit request through the “request re entire amount billed. Precertification and Prior Authorizations: If your physician has recommended that you have a procedure or additional testing performed.   [facility]) will contact your insurance carrier to obtain pre-certi

## 2018-09-13 ENCOUNTER — TELEPHONE (OUTPATIENT)
Dept: SURGERY | Facility: CLINIC | Age: 63
End: 2018-09-13

## 2018-09-13 NOTE — TELEPHONE ENCOUNTER
Per Frida Mcdaniel:    Please add patient on to Dr Shea Manley schedule for 10/4/18 2:30 or 3:30 pm   thanks (Routing comment)

## 2018-09-18 ENCOUNTER — TELEPHONE (OUTPATIENT)
Dept: SURGERY | Facility: CLINIC | Age: 63
End: 2018-09-18

## 2018-09-18 RX ORDER — DIAZEPAM 5 MG/1
5 TABLET ORAL 2 TIMES DAILY PRN
Qty: 2 TABLET | Refills: 0 | OUTPATIENT
Start: 2018-09-18 | End: 2018-10-04 | Stop reason: ALTCHOICE

## 2018-09-18 NOTE — TELEPHONE ENCOUNTER
Per FREEDOM Parry: \"Patient lost Valium for Thursday's MRI. Please phone if to his pharmacy. New order placed   thanks \"    Rx phoned in to patient's pharmacy. Nothing further needed at this time.

## 2018-09-21 ENCOUNTER — CHARTING TRANS (OUTPATIENT)
Dept: OTHER | Age: 63
End: 2018-09-21

## 2018-10-04 ENCOUNTER — OFFICE VISIT (OUTPATIENT)
Dept: SURGERY | Facility: CLINIC | Age: 63
End: 2018-10-04
Payer: COMMERCIAL

## 2018-10-04 VITALS — DIASTOLIC BLOOD PRESSURE: 80 MMHG | SYSTOLIC BLOOD PRESSURE: 160 MMHG | HEART RATE: 100 BPM

## 2018-10-04 DIAGNOSIS — R29.898 WEAKNESS OF LEFT LEG: ICD-10-CM

## 2018-10-04 DIAGNOSIS — M54.12 CERVICAL MYELOPATHY WITH CERVICAL RADICULOPATHY (HCC): ICD-10-CM

## 2018-10-04 DIAGNOSIS — G95.9 CERVICAL MYELOPATHY WITH CERVICAL RADICULOPATHY (HCC): ICD-10-CM

## 2018-10-04 DIAGNOSIS — R29.898 WEAKNESS OF BOTH ARMS: ICD-10-CM

## 2018-10-04 DIAGNOSIS — R26.9 GAIT DISORDER: ICD-10-CM

## 2018-10-04 DIAGNOSIS — M47.22 CERVICAL SPONDYLOSIS WITH RADICULOPATHY: Primary | ICD-10-CM

## 2018-10-04 PROCEDURE — 99213 OFFICE O/P EST LOW 20 MIN: CPT | Performed by: PHYSICIAN ASSISTANT

## 2018-10-04 RX ORDER — HYDROCODONE BITARTRATE AND ACETAMINOPHEN 10; 325 MG/1; MG/1
1 TABLET ORAL EVERY 6 HOURS PRN
Qty: 90 TABLET | Refills: 0 | Status: SHIPPED | OUTPATIENT
Start: 2018-10-04 | End: 2019-01-01 | Stop reason: ALTCHOICE

## 2018-10-04 NOTE — PROGRESS NOTES
Pt is here for follow up to review imaging. MRI of the cervical and possible SX discussion. Pt states that he is still the same since the last office visit.    Imaging date 9/20/18

## 2018-10-04 NOTE — PROGRESS NOTES
Neurosurgery follow up        1. C5-6 ADR Dr Marquez Christopher 10/2/17  2. Left shoulder surgery March 2018 Dr. Sharleen Epley is a 63year old LH male here in follow-up. Medrol dose packed helped left shoulder and arm.  He is he had a slight decline since his last visit. His neck pain is a 2/10. He still gets pain that goes into the left trapezius muscle and into the shoulder mostly at night. Turning to the right gives him most of his neck pain.   He is taking Norco mostly at ni had an injection in his left fourth finger after which time there was another pop.   He was diagnosed with another rupture of the A2 pulley was placed in rehab from July until September he was placed in work conditioning program.     September 2016 he saw D Comment: jean tendon repair  1982: OTHER Left      Comment: knee scope      FAMILY HISTORY:  family history is not on file.     SOCIAL HISTORY:   reports that he has quit smoking. He does not have any smokeless tobacco history on file.  He reports th 5       5          5             DIAGNOSTIC DATA:  EMG of the upper extremity 4/18/17 negative      IMAGING:  MRI CS C2-3-4-5, C6-7 unchanged. C5-6 artifact.      X-rays of the cervical spine 9/6/18 show artificial disc replacements in good position at C5-6

## 2018-10-05 ENCOUNTER — TELEPHONE (OUTPATIENT)
Dept: SURGERY | Facility: CLINIC | Age: 63
End: 2018-10-05

## 2018-10-24 NOTE — PAT NURSING NOTE
Procedure Arrival Time: 1400    Arrival instructions:  Bring insurance card(s) and picture ID, Leave all valuables and jewelry at home, Wear appropriate clothing and Contacts or glasses as needed    Parking: All other: Park in blue lot, use Main entrance,

## 2018-10-24 NOTE — PROCEDURES
[facility]  [address] [facility]  Phone- [phone]  Nerve Conduction & Electromyography Report            Patient: Arturo Bradley  Patient ID: [id number]  YOB: 1955  Age: 63 Years 8 Wrist ADM 2.92 ?3.60 10.5 ?5.0 100  91 ?50.00 Wrist - ADM 7        B. Elbow ADM 5.52  10.1  96.3 ?115 91.2  B. Elbow - Wrist 18 69 ?49      A. Elbow ADM 7.97  9.5  94.1 ?115 90.4  A. Elbow - B. Elbow 16 65 ?49   R ULNAR - ADM      Wrist ADM 3.23 ?3.60 11.6 ?

## 2018-10-29 NOTE — IMAGING NOTE
1310:  Received Mr. Arnold . post cervical myelogram and CT scan,  lying supine on a cart with head of bead elevated 30 degrees. Wife Maren Divine at bedside. Mr. Stallworth Book reported a \"mild\" headache, 2/10. Dressing to mid-back dry and intact.    See fl

## 2018-12-15 PROBLEM — K42.9 UMBILICAL HERNIA WITHOUT OBSTRUCTION AND WITHOUT GANGRENE: Status: ACTIVE | Noted: 2018-01-01

## 2018-12-15 PROBLEM — K43.9 VENTRAL HERNIA WITHOUT OBSTRUCTION OR GANGRENE: Status: ACTIVE | Noted: 2018-01-01

## 2018-12-15 PROBLEM — E78.2 MIXED HYPERLIPIDEMIA: Status: ACTIVE | Noted: 2018-01-01

## 2018-12-15 PROBLEM — I10 ESSENTIAL HYPERTENSION: Status: ACTIVE | Noted: 2018-01-01

## 2019-01-01 ENCOUNTER — APPOINTMENT (OUTPATIENT)
Dept: GENERAL RADIOLOGY | Facility: HOSPITAL | Age: 64
DRG: 471 | End: 2019-01-01
Attending: INTERNAL MEDICINE
Payer: MEDICARE

## 2019-01-01 ENCOUNTER — APPOINTMENT (OUTPATIENT)
Dept: CV DIAGNOSTICS | Facility: HOSPITAL | Age: 64
DRG: 471 | End: 2019-01-01
Attending: NURSE PRACTITIONER
Payer: MEDICARE

## 2019-01-01 ENCOUNTER — OFFICE VISIT (OUTPATIENT)
Dept: INTERNAL MEDICINE | Age: 64
End: 2019-01-01

## 2019-01-01 ENCOUNTER — APPOINTMENT (OUTPATIENT)
Dept: MRI IMAGING | Facility: HOSPITAL | Age: 64
DRG: 471 | End: 2019-01-01
Attending: NEUROLOGICAL SURGERY
Payer: MEDICARE

## 2019-01-01 ENCOUNTER — HOSPITAL ENCOUNTER (INPATIENT)
Facility: HOSPITAL | Age: 64
LOS: 10 days | DRG: 471 | End: 2019-01-01
Attending: NEUROLOGICAL SURGERY | Admitting: NEUROLOGICAL SURGERY
Payer: MEDICARE

## 2019-01-01 ENCOUNTER — TELEPHONE (OUTPATIENT)
Dept: SURGERY | Facility: CLINIC | Age: 64
End: 2019-01-01

## 2019-01-01 ENCOUNTER — ANESTHESIA EVENT (OUTPATIENT)
Dept: SURGERY | Facility: HOSPITAL | Age: 64
End: 2019-01-01

## 2019-01-01 ENCOUNTER — APPOINTMENT (OUTPATIENT)
Dept: CT IMAGING | Facility: HOSPITAL | Age: 64
DRG: 471 | End: 2019-01-01
Attending: NURSE PRACTITIONER
Payer: MEDICARE

## 2019-01-01 ENCOUNTER — OFFICE VISIT (OUTPATIENT)
Dept: SURGERY | Facility: CLINIC | Age: 64
End: 2019-01-01
Payer: MEDICARE

## 2019-01-01 ENCOUNTER — OFFICE VISIT (OUTPATIENT)
Dept: SURGERY | Age: 64
End: 2019-01-01

## 2019-01-01 ENCOUNTER — LAB SERVICES (OUTPATIENT)
Dept: LAB | Age: 64
End: 2019-01-01

## 2019-01-01 ENCOUNTER — OFFICE VISIT (OUTPATIENT)
Dept: GASTROENTEROLOGY | Age: 64
End: 2019-01-01

## 2019-01-01 ENCOUNTER — TELEPHONE (OUTPATIENT)
Dept: INTERNAL MEDICINE | Age: 64
End: 2019-01-01

## 2019-01-01 ENCOUNTER — TELEPHONE (OUTPATIENT)
Dept: GASTROENTEROLOGY | Age: 64
End: 2019-01-01

## 2019-01-01 ENCOUNTER — LABORATORY ENCOUNTER (OUTPATIENT)
Dept: LAB | Facility: HOSPITAL | Age: 64
End: 2019-01-01
Attending: NEUROLOGICAL SURGERY
Payer: MEDICARE

## 2019-01-01 ENCOUNTER — HOSPITAL ENCOUNTER (OUTPATIENT)
Dept: MRI IMAGING | Age: 64
Discharge: HOME | DRG: 471 | End: 2019-01-01
Attending: PHYSICIAN ASSISTANT
Payer: MEDICARE

## 2019-01-01 ENCOUNTER — IMAGING SERVICES (OUTPATIENT)
Dept: GENERAL RADIOLOGY | Age: 64
End: 2019-01-01
Attending: INTERNAL MEDICINE

## 2019-01-01 ENCOUNTER — APPOINTMENT (OUTPATIENT)
Dept: SURGERY | Age: 64
End: 2019-01-01

## 2019-01-01 ENCOUNTER — OFFICE VISIT (OUTPATIENT)
Dept: GASTROENTEROLOGY | Age: 64
End: 2019-01-01
Attending: INTERNAL MEDICINE

## 2019-01-01 ENCOUNTER — IMAGING SERVICES (OUTPATIENT)
Dept: GENERAL RADIOLOGY | Age: 64
End: 2019-01-01
Attending: SURGERY

## 2019-01-01 ENCOUNTER — APPOINTMENT (OUTPATIENT)
Dept: GENERAL RADIOLOGY | Facility: HOSPITAL | Age: 64
DRG: 471 | End: 2019-01-01
Attending: NEUROLOGICAL SURGERY
Payer: MEDICARE

## 2019-01-01 ENCOUNTER — APPOINTMENT (OUTPATIENT)
Dept: GENERAL RADIOLOGY | Facility: HOSPITAL | Age: 64
DRG: 471 | End: 2019-01-01
Attending: NURSE PRACTITIONER
Payer: MEDICARE

## 2019-01-01 ENCOUNTER — ANESTHESIA (OUTPATIENT)
Dept: SURGERY | Facility: HOSPITAL | Age: 64
End: 2019-01-01

## 2019-01-01 ENCOUNTER — APPOINTMENT (OUTPATIENT)
Dept: GENERAL RADIOLOGY | Facility: HOSPITAL | Age: 64
DRG: 471 | End: 2019-01-01
Attending: Other
Payer: MEDICARE

## 2019-01-01 ENCOUNTER — APPOINTMENT (OUTPATIENT)
Dept: LAB | Facility: HOSPITAL | Age: 64
End: 2019-01-01
Payer: MEDICARE

## 2019-01-01 ENCOUNTER — HOSPITAL ENCOUNTER (OUTPATIENT)
Dept: PHYSICAL THERAPY | Facility: HOSPITAL | Age: 64
Discharge: HOME | End: 2019-01-01
Attending: NEUROLOGICAL SURGERY
Payer: MEDICARE

## 2019-01-01 ENCOUNTER — APPOINTMENT (OUTPATIENT)
Dept: CT IMAGING | Facility: HOSPITAL | Age: 64
DRG: 471 | End: 2019-01-01
Attending: PHYSICIAN ASSISTANT
Payer: MEDICARE

## 2019-01-01 ENCOUNTER — HOSPITAL ENCOUNTER (OUTPATIENT)
Dept: GENERAL RADIOLOGY | Facility: HOSPITAL | Age: 64
Discharge: HOME | End: 2019-01-01
Attending: PHYSICIAN ASSISTANT
Payer: COMMERCIAL

## 2019-01-01 ENCOUNTER — WALK IN (OUTPATIENT)
Dept: URGENT CARE | Age: 64
End: 2019-01-01

## 2019-01-01 ENCOUNTER — OFFICE VISIT (OUTPATIENT)
Dept: URGENT CARE | Age: 64
End: 2019-01-01

## 2019-01-01 ENCOUNTER — HOSPITAL ENCOUNTER (OUTPATIENT)
Dept: GENERAL RADIOLOGY | Age: 64
Discharge: HOME | DRG: 471 | End: 2019-01-01
Attending: PHYSICIAN ASSISTANT
Payer: MEDICARE

## 2019-01-01 VITALS — TEMPERATURE: 98.1 F | WEIGHT: 215 LBS | BODY MASS INDEX: 32.58 KG/M2 | HEIGHT: 68 IN

## 2019-01-01 VITALS — TEMPERATURE: 97.4 F | WEIGHT: 225 LBS | HEIGHT: 68 IN | BODY MASS INDEX: 34.1 KG/M2

## 2019-01-01 VITALS
BODY MASS INDEX: 38.8 KG/M2 | SYSTOLIC BLOOD PRESSURE: 132 MMHG | HEIGHT: 68 IN | DIASTOLIC BLOOD PRESSURE: 78 MMHG | WEIGHT: 256 LBS | TEMPERATURE: 97.2 F | HEART RATE: 84 BPM

## 2019-01-01 VITALS
TEMPERATURE: 99 F | HEIGHT: 68 IN | HEART RATE: 126 BPM | SYSTOLIC BLOOD PRESSURE: 134 MMHG | BODY MASS INDEX: 33.25 KG/M2 | OXYGEN SATURATION: 63 % | DIASTOLIC BLOOD PRESSURE: 60 MMHG | RESPIRATION RATE: 33 BRPM | WEIGHT: 219.38 LBS

## 2019-01-01 VITALS
DIASTOLIC BLOOD PRESSURE: 90 MMHG | HEIGHT: 68 IN | TEMPERATURE: 96.7 F | BODY MASS INDEX: 34.86 KG/M2 | SYSTOLIC BLOOD PRESSURE: 160 MMHG | WEIGHT: 230 LBS

## 2019-01-01 VITALS
DIASTOLIC BLOOD PRESSURE: 80 MMHG | HEIGHT: 68 IN | BODY MASS INDEX: 32.58 KG/M2 | SYSTOLIC BLOOD PRESSURE: 142 MMHG | WEIGHT: 215 LBS

## 2019-01-01 VITALS
WEIGHT: 228 LBS | BODY MASS INDEX: 35 KG/M2 | HEART RATE: 96 BPM | DIASTOLIC BLOOD PRESSURE: 90 MMHG | SYSTOLIC BLOOD PRESSURE: 140 MMHG

## 2019-01-01 VITALS — WEIGHT: 236 LBS | TEMPERATURE: 98.2 F | BODY MASS INDEX: 35.77 KG/M2 | HEIGHT: 68 IN

## 2019-01-01 VITALS
BODY MASS INDEX: 32.89 KG/M2 | WEIGHT: 217 LBS | TEMPERATURE: 96.3 F | DIASTOLIC BLOOD PRESSURE: 84 MMHG | HEIGHT: 68 IN | SYSTOLIC BLOOD PRESSURE: 144 MMHG

## 2019-01-01 VITALS
DIASTOLIC BLOOD PRESSURE: 90 MMHG | TEMPERATURE: 97 F | HEART RATE: 88 BPM | SYSTOLIC BLOOD PRESSURE: 156 MMHG | HEIGHT: 68 IN | BODY MASS INDEX: 35.92 KG/M2 | WEIGHT: 237 LBS

## 2019-01-01 VITALS
TEMPERATURE: 97.4 F | DIASTOLIC BLOOD PRESSURE: 90 MMHG | BODY MASS INDEX: 33.49 KG/M2 | WEIGHT: 221 LBS | SYSTOLIC BLOOD PRESSURE: 162 MMHG | HEART RATE: 80 BPM | HEIGHT: 68 IN

## 2019-01-01 VITALS — SYSTOLIC BLOOD PRESSURE: 145 MMHG | HEART RATE: 92 BPM | DIASTOLIC BLOOD PRESSURE: 84 MMHG

## 2019-01-01 VITALS
DIASTOLIC BLOOD PRESSURE: 94 MMHG | WEIGHT: 230 LBS | HEIGHT: 68 IN | TEMPERATURE: 97.8 F | BODY MASS INDEX: 34.86 KG/M2 | SYSTOLIC BLOOD PRESSURE: 172 MMHG

## 2019-01-01 VITALS
HEIGHT: 68 IN | BODY MASS INDEX: 32.58 KG/M2 | DIASTOLIC BLOOD PRESSURE: 86 MMHG | TEMPERATURE: 97.3 F | HEART RATE: 84 BPM | SYSTOLIC BLOOD PRESSURE: 154 MMHG | WEIGHT: 215 LBS

## 2019-01-01 VITALS — SYSTOLIC BLOOD PRESSURE: 142 MMHG | HEART RATE: 80 BPM | DIASTOLIC BLOOD PRESSURE: 88 MMHG

## 2019-01-01 VITALS — TEMPERATURE: 97.9 F

## 2019-01-01 VITALS — TEMPERATURE: 97.6 F | BODY MASS INDEX: 32.58 KG/M2 | HEIGHT: 68 IN | WEIGHT: 215 LBS

## 2019-01-01 DIAGNOSIS — M54.2 NECK PAIN: ICD-10-CM

## 2019-01-01 DIAGNOSIS — R29.898 WEAKNESS OF BOTH LOWER EXTREMITIES: ICD-10-CM

## 2019-01-01 DIAGNOSIS — G95.9 CERVICAL MYELOPATHY WITH CERVICAL RADICULOPATHY (HCC): ICD-10-CM

## 2019-01-01 DIAGNOSIS — M47.12 OSTEOARTHRITIS OF CERVICAL SPINE WITH MYELOPATHY: Primary | ICD-10-CM

## 2019-01-01 DIAGNOSIS — K21.9 CHRONIC GERD: ICD-10-CM

## 2019-01-01 DIAGNOSIS — Z98.890 S/P CERVICAL DISC REPLACEMENT: ICD-10-CM

## 2019-01-01 DIAGNOSIS — R29.898 WEAKNESS OF UPPER EXTREMITY: ICD-10-CM

## 2019-01-01 DIAGNOSIS — Z12.11 SPECIAL SCREENING FOR MALIGNANT NEOPLASMS, COLON: ICD-10-CM

## 2019-01-01 DIAGNOSIS — R29.898 WEAKNESS OF BOTH UPPER EXTREMITIES: ICD-10-CM

## 2019-01-01 DIAGNOSIS — K59.00 CONSTIPATION, UNSPECIFIED CONSTIPATION TYPE: ICD-10-CM

## 2019-01-01 DIAGNOSIS — R13.19 ESOPHAGEAL DYSPHAGIA: Primary | ICD-10-CM

## 2019-01-01 DIAGNOSIS — M54.12 CERVICAL MYELOPATHY WITH CERVICAL RADICULOPATHY (HCC): Primary | ICD-10-CM

## 2019-01-01 DIAGNOSIS — M47.12 OSTEOARTHRITIS OF CERVICAL SPINE WITH MYELOPATHY: ICD-10-CM

## 2019-01-01 DIAGNOSIS — R13.10 DYSPHAGIA: ICD-10-CM

## 2019-01-01 DIAGNOSIS — R14.0 BLOATING: Primary | ICD-10-CM

## 2019-01-01 DIAGNOSIS — K21.9 GASTRO-ESOPHAGEAL REFLUX DISEASE WITHOUT ESOPHAGITIS: ICD-10-CM

## 2019-01-01 DIAGNOSIS — I10 ESSENTIAL HYPERTENSION: ICD-10-CM

## 2019-01-01 DIAGNOSIS — S61.211A LACERATION OF LEFT INDEX FINGER WITHOUT FOREIGN BODY WITHOUT DAMAGE TO NAIL, INITIAL ENCOUNTER: Primary | ICD-10-CM

## 2019-01-01 DIAGNOSIS — K42.0 INCARCERATED UMBILICAL HERNIA: Primary | ICD-10-CM

## 2019-01-01 DIAGNOSIS — K21.9 CHRONIC GERD: Primary | ICD-10-CM

## 2019-01-01 DIAGNOSIS — M48.02 CERVICAL SPINAL STENOSIS: ICD-10-CM

## 2019-01-01 DIAGNOSIS — E78.2 MIXED HYPERLIPIDEMIA: ICD-10-CM

## 2019-01-01 DIAGNOSIS — M54.12 CERVICAL RADICULOPATHY AT C5: ICD-10-CM

## 2019-01-01 DIAGNOSIS — M48.02 CERVICAL STENOSIS OF SPINAL CANAL: Primary | ICD-10-CM

## 2019-01-01 DIAGNOSIS — K42.0 UMBILICAL HERNIA WITH OBSTRUCTION, WITHOUT GANGRENE: Primary | ICD-10-CM

## 2019-01-01 DIAGNOSIS — R73.01 IFG (IMPAIRED FASTING GLUCOSE): ICD-10-CM

## 2019-01-01 DIAGNOSIS — R14.0 BLOATING: ICD-10-CM

## 2019-01-01 DIAGNOSIS — G95.9 CERVICAL MYELOPATHY WITH CERVICAL RADICULOPATHY (HCC): Primary | ICD-10-CM

## 2019-01-01 DIAGNOSIS — M48.02 CERVICAL STENOSIS OF SPINE: ICD-10-CM

## 2019-01-01 DIAGNOSIS — M54.12 CERVICAL RADICULOPATHY, CHRONIC: ICD-10-CM

## 2019-01-01 DIAGNOSIS — Z12.11 ENCOUNTER FOR SCREENING FOR MALIGNANT NEOPLASM OF COLON: ICD-10-CM

## 2019-01-01 DIAGNOSIS — Z48.02 VISIT FOR SUTURE REMOVAL: Primary | ICD-10-CM

## 2019-01-01 DIAGNOSIS — M54.12 CERVICAL MYELOPATHY WITH CERVICAL RADICULOPATHY (HCC): ICD-10-CM

## 2019-01-01 DIAGNOSIS — S39.012A LUMBAR STRAIN, INITIAL ENCOUNTER: ICD-10-CM

## 2019-01-01 DIAGNOSIS — R73.01 IFG (IMPAIRED FASTING GLUCOSE): Primary | ICD-10-CM

## 2019-01-01 DIAGNOSIS — K42.9 UMBILICAL HERNIA WITHOUT OBSTRUCTION AND WITHOUT GANGRENE: ICD-10-CM

## 2019-01-01 DIAGNOSIS — Z01.818 PREOP EXAMINATION: Primary | ICD-10-CM

## 2019-01-01 DIAGNOSIS — K43.9 VENTRAL HERNIA WITHOUT OBSTRUCTION OR GANGRENE: ICD-10-CM

## 2019-01-01 DIAGNOSIS — S16.1XXA ACUTE STRAIN OF NECK MUSCLE, INITIAL ENCOUNTER: ICD-10-CM

## 2019-01-01 DIAGNOSIS — E78.2 MIXED HYPERLIPIDEMIA: Primary | ICD-10-CM

## 2019-01-01 DIAGNOSIS — M48.02 CERVICAL STENOSIS OF SPINAL CANAL: ICD-10-CM

## 2019-01-01 DIAGNOSIS — Z01.818 PREOP EXAMINATION: ICD-10-CM

## 2019-01-01 DIAGNOSIS — D12.2 BENIGN NEOPLASM OF ASCENDING COLON: ICD-10-CM

## 2019-01-01 LAB
ALBUMIN SERPL BCG-MCNC: 4.3 G/DL (ref 3.6–5.1)
ALBUMIN SERPL-MCNC: 4 G/DL (ref 3.4–5)
ALBUMIN/GLOB SERPL: 1.2 {RATIO} (ref 1–2)
ALP LIVER SERPL-CCNC: 105 U/L (ref 45–117)
ALP SERPL-CCNC: 111 U/L (ref 45–115)
ALT SERPL W/O P-5'-P-CCNC: 19 U/L (ref 10–35)
ALT SERPL-CCNC: 22 U/L (ref 16–61)
ANION GAP SERPL CALC-SCNC: 5 MMOL/L (ref 0–18)
ANTIBODY SCREEN: NEGATIVE
AP REPORT: NORMAL
APTT PPP: 31.6 SECONDS (ref 25.4–36.1)
AST SERPL-CCNC: 13 U/L (ref 15–37)
AST SERPL-CCNC: 17 U/L (ref 9–37)
ATRIAL RATE: 74 BPM
BASOPHILS # BLD AUTO: 0.02 X10(3) UL (ref 0–0.2)
BASOPHILS NFR BLD AUTO: 0.3 %
BILIRUB SERPL-MCNC: 0.4 MG/DL (ref 0–1)
BILIRUB SERPL-MCNC: 0.6 MG/DL (ref 0.1–2)
BUN BLD-MCNC: 17 MG/DL (ref 7–18)
BUN SERPL-MCNC: 15 MG/DL (ref 6–27)
BUN/CREAT SERPL: 15.9 (ref 10–20)
CALCIUM BLD-MCNC: 8.7 MG/DL (ref 8.5–10.1)
CALCIUM SERPL-MCNC: 9.5 MG/DL (ref 8.6–10.6)
CHLORIDE SERPL-SCNC: 104 MMOL/L (ref 96–107)
CHLORIDE SERPL-SCNC: 108 MMOL/L (ref 98–112)
CHOLEST SERPL-MCNC: 155 MG/DL (ref 140–200)
CO2 SERPL-SCNC: 31 MMOL/L (ref 21–32)
CREAT BLD-MCNC: 1.07 MG/DL (ref 0.7–1.3)
CREAT SERPL-MCNC: 1 MG/DL (ref 0.6–1.6)
DEPRECATED RDW RBC AUTO: 39.1 FL (ref 35.1–46.3)
EOSINOPHIL # BLD AUTO: 0.1 X10(3) UL (ref 0–0.7)
EOSINOPHIL NFR BLD AUTO: 1.5 %
ERYTHROCYTE [DISTWIDTH] IN BLOOD BY AUTOMATED COUNT: 11.9 % (ref 11–15)
EST. AVERAGE GLUCOSE BLD GHB EST-MCNC: 104 MG/DL (ref 0–154)
GFR SERPL CREATININE-BSD FRML MDRD: >60 ML/MIN/{1.73M2}
GFR SERPL CREATININE-BSD FRML MDRD: >60 ML/MIN/{1.73M2}
GLOBULIN PLAS-MCNC: 3.4 G/DL (ref 2.8–4.4)
GLUCOSE BLD-MCNC: 96 MG/DL (ref 70–99)
GLUCOSE P FAST SERPL-MCNC: 132 MG/DL (ref 60–100)
HBA1C MFR BLD: 5.3 % (ref 4.2–6)
HCO3 SERPL-SCNC: 28 MMOL/L (ref 22–32)
HCT VFR BLD AUTO: 46.3 % (ref 39–53)
HDLC SERPL-MCNC: 45 MG/DL
HGB BLD-MCNC: 15.6 G/DL (ref 13–17.5)
IMM GRANULOCYTES # BLD AUTO: 0.01 X10(3) UL (ref 0–1)
IMM GRANULOCYTES NFR BLD: 0.1 %
INR BLD: 1.03 (ref 0.9–1.1)
LDLC SERPL CALC-MCNC: 91 MG/DL (ref 0–100)
LYMPHOCYTES # BLD AUTO: 1.92 X10(3) UL (ref 1–4)
LYMPHOCYTES NFR BLD AUTO: 28.7 %
M PROTEIN MFR SERPL ELPH: 7.4 G/DL (ref 6.4–8.2)
MCH RBC QN AUTO: 30.2 PG (ref 26–34)
MCHC RBC AUTO-ENTMCNC: 33.7 G/DL (ref 31–37)
MCV RBC AUTO: 89.7 FL (ref 80–100)
MONOCYTES # BLD AUTO: 0.55 X10(3) UL (ref 0.1–1)
MONOCYTES NFR BLD AUTO: 8.2 %
NEUTROPHILS # BLD AUTO: 4.09 X10 (3) UL (ref 1.5–7.7)
NEUTROPHILS # BLD AUTO: 4.09 X10(3) UL (ref 1.5–7.7)
NEUTROPHILS NFR BLD AUTO: 61.2 %
OSMOLALITY SERPL CALC.SUM OF ELEC: 299 MOSM/KG (ref 275–295)
P AXIS: 51 DEGREES
P-R INTERVAL: 174 MS
PLATELET # BLD AUTO: 209 10(3)UL (ref 150–450)
POTASSIUM SERPL-SCNC: 4.6 MMOL/L (ref 3.5–5.1)
POTASSIUM SERPL-SCNC: 5.2 MMOL/L (ref 3.5–5.3)
PROT SERPL-MCNC: 6.3 G/DL (ref 6.2–8.1)
PSA SERPL DL<=0.01 NG/ML-MCNC: 13.9 SECONDS (ref 12.5–14.7)
Q-T INTERVAL: 388 MS
QRS DURATION: 130 MS
QTC CALCULATION (BEZET): 430 MS
R AXIS: 31 DEGREES
RBC # BLD AUTO: 5.16 X10(6)UL (ref 4.3–5.7)
RH BLOOD TYPE: POSITIVE
SODIUM SERPL-SCNC: 142 MMOL/L (ref 136–146)
SODIUM SERPL-SCNC: 144 MMOL/L (ref 136–145)
T AXIS: 8 DEGREES
TRIGL SERPL-MCNC: 94 MG/DL (ref 0–200)
VENTRICULAR RATE: 74 BPM
WBC # BLD AUTO: 6.7 X10(3) UL (ref 4–11)

## 2019-01-01 PROCEDURE — 88312 SPECIAL STAINS GROUP 1: CPT | Performed by: PATHOLOGY

## 2019-01-01 PROCEDURE — 99291 CRITICAL CARE FIRST HOUR: CPT | Performed by: OTHER

## 2019-01-01 PROCEDURE — 72141 MRI NECK SPINE W/O DYE: CPT | Performed by: NEUROLOGICAL SURGERY

## 2019-01-01 PROCEDURE — 99221 1ST HOSP IP/OBS SF/LOW 40: CPT | Performed by: INTERNAL MEDICINE

## 2019-01-01 PROCEDURE — 45380 COLONOSCOPY AND BIOPSY: CPT | Performed by: INTERNAL MEDICINE

## 2019-01-01 PROCEDURE — 99024 POSTOP FOLLOW-UP VISIT: CPT | Performed by: NEUROLOGICAL SURGERY

## 2019-01-01 PROCEDURE — 99215 OFFICE O/P EST HI 40 MIN: CPT | Performed by: INTERNAL MEDICINE

## 2019-01-01 PROCEDURE — 71045 X-RAY EXAM CHEST 1 VIEW: CPT | Performed by: OTHER

## 2019-01-01 PROCEDURE — B548ZZA ULTRASONOGRAPHY OF SUPERIOR VENA CAVA, GUIDANCE: ICD-10-PCS | Performed by: NEUROLOGICAL SURGERY

## 2019-01-01 PROCEDURE — 93306 TTE W/DOPPLER COMPLETE: CPT | Performed by: NURSE PRACTITIONER

## 2019-01-01 PROCEDURE — 86900 BLOOD TYPING SEROLOGIC ABO: CPT

## 2019-01-01 PROCEDURE — 71045 X-RAY EXAM CHEST 1 VIEW: CPT | Performed by: NURSE PRACTITIONER

## 2019-01-01 PROCEDURE — 70498 CT ANGIOGRAPHY NECK: CPT | Performed by: PHYSICIAN ASSISTANT

## 2019-01-01 PROCEDURE — 43239 EGD BIOPSY SINGLE/MULTIPLE: CPT | Performed by: INTERNAL MEDICINE

## 2019-01-01 PROCEDURE — 99233 SBSQ HOSP IP/OBS HIGH 50: CPT | Performed by: INTERNAL MEDICINE

## 2019-01-01 PROCEDURE — 99232 SBSQ HOSP IP/OBS MODERATE 35: CPT | Performed by: HOSPITALIST

## 2019-01-01 PROCEDURE — 99233 SBSQ HOSP IP/OBS HIGH 50: CPT | Performed by: OTHER

## 2019-01-01 PROCEDURE — 80061 LIPID PANEL: CPT | Performed by: INTERNAL MEDICINE

## 2019-01-01 PROCEDURE — X1094 NO CHARGE VISIT: HCPCS | Performed by: FAMILY MEDICINE

## 2019-01-01 PROCEDURE — 85730 THROMBOPLASTIN TIME PARTIAL: CPT

## 2019-01-01 PROCEDURE — 99223 1ST HOSP IP/OBS HIGH 75: CPT | Performed by: HOSPITALIST

## 2019-01-01 PROCEDURE — 87081 CULTURE SCREEN ONLY: CPT

## 2019-01-01 PROCEDURE — 71045 X-RAY EXAM CHEST 1 VIEW: CPT | Performed by: INTERNAL MEDICINE

## 2019-01-01 PROCEDURE — 74018 RADEX ABDOMEN 1 VIEW: CPT | Performed by: RADIOLOGY

## 2019-01-01 PROCEDURE — 76000 FLUOROSCOPY <1 HR PHYS/QHP: CPT | Performed by: NEUROLOGICAL SURGERY

## 2019-01-01 PROCEDURE — 99213 OFFICE O/P EST LOW 20 MIN: CPT | Performed by: PHYSICIAN ASSISTANT

## 2019-01-01 PROCEDURE — 70551 MRI BRAIN STEM W/O DYE: CPT | Performed by: NEUROLOGICAL SURGERY

## 2019-01-01 PROCEDURE — 99212 OFFICE O/P EST SF 10 MIN: CPT | Performed by: SURGERY

## 2019-01-01 PROCEDURE — 46600 DIAGNOSTIC ANOSCOPY SPX: CPT | Performed by: SURGERY

## 2019-01-01 PROCEDURE — 72050 X-RAY EXAM NECK SPINE 4/5VWS: CPT | Performed by: PHYSICIAN ASSISTANT

## 2019-01-01 PROCEDURE — 86901 BLOOD TYPING SEROLOGIC RH(D): CPT

## 2019-01-01 PROCEDURE — 02HV33Z INSERTION OF INFUSION DEVICE INTO SUPERIOR VENA CAVA, PERCUTANEOUS APPROACH: ICD-10-PCS | Performed by: NEUROLOGICAL SURGERY

## 2019-01-01 PROCEDURE — 85025 COMPLETE CBC W/AUTO DIFF WBC: CPT

## 2019-01-01 PROCEDURE — 99213 OFFICE O/P EST LOW 20 MIN: CPT | Performed by: SURGERY

## 2019-01-01 PROCEDURE — 99214 OFFICE O/P EST MOD 30 MIN: CPT | Performed by: INTERNAL MEDICINE

## 2019-01-01 PROCEDURE — 0BH18EZ INSERTION OF ENDOTRACHEAL AIRWAY INTO TRACHEA, VIA NATURAL OR ARTIFICIAL OPENING ENDOSCOPIC: ICD-10-PCS | Performed by: ANESTHESIOLOGY

## 2019-01-01 PROCEDURE — 5A1955Z RESPIRATORY VENTILATION, GREATER THAN 96 CONSECUTIVE HOURS: ICD-10-PCS | Performed by: ANESTHESIOLOGY

## 2019-01-01 PROCEDURE — 93010 ELECTROCARDIOGRAM REPORT: CPT | Performed by: INTERNAL MEDICINE

## 2019-01-01 PROCEDURE — 00NW0ZZ RELEASE CERVICAL SPINAL CORD, OPEN APPROACH: ICD-10-PCS | Performed by: NEUROLOGICAL SURGERY

## 2019-01-01 PROCEDURE — 80053 COMPREHEN METABOLIC PANEL: CPT | Performed by: INTERNAL MEDICINE

## 2019-01-01 PROCEDURE — 99233 SBSQ HOSP IP/OBS HIGH 50: CPT | Performed by: HOSPITALIST

## 2019-01-01 PROCEDURE — 93005 ELECTROCARDIOGRAM TRACING: CPT

## 2019-01-01 PROCEDURE — 99204 OFFICE O/P NEW MOD 45 MIN: CPT | Performed by: INTERNAL MEDICINE

## 2019-01-01 PROCEDURE — 95819 EEG AWAKE AND ASLEEP: CPT | Performed by: OTHER

## 2019-01-01 PROCEDURE — 12042 INTMD RPR N-HF/GENIT2.6-7.5: CPT | Performed by: FAMILY MEDICINE

## 2019-01-01 PROCEDURE — 70551 MRI BRAIN STEM W/O DYE: CPT | Performed by: NURSE PRACTITIONER

## 2019-01-01 PROCEDURE — 86850 RBC ANTIBODY SCREEN: CPT

## 2019-01-01 PROCEDURE — 88342 IMHCHEM/IMCYTCHM 1ST ANTB: CPT | Performed by: PATHOLOGY

## 2019-01-01 PROCEDURE — 36415 COLL VENOUS BLD VENIPUNCTURE: CPT | Performed by: INTERNAL MEDICINE

## 2019-01-01 PROCEDURE — 71046 X-RAY EXAM CHEST 2 VIEWS: CPT | Performed by: RADIOLOGY

## 2019-01-01 PROCEDURE — 88341 IMHCHEM/IMCYTCHM EA ADD ANTB: CPT | Performed by: PATHOLOGY

## 2019-01-01 PROCEDURE — 0RB30ZZ EXCISION OF CERVICAL VERTEBRAL DISC, OPEN APPROACH: ICD-10-PCS | Performed by: NEUROLOGICAL SURGERY

## 2019-01-01 PROCEDURE — 70450 CT HEAD/BRAIN W/O DYE: CPT | Performed by: NURSE PRACTITIONER

## 2019-01-01 PROCEDURE — 99291 CRITICAL CARE FIRST HOUR: CPT | Performed by: NURSE PRACTITIONER

## 2019-01-01 PROCEDURE — 88305 TISSUE EXAM BY PATHOLOGIST: CPT | Performed by: PATHOLOGY

## 2019-01-01 PROCEDURE — 0RG20K0 FUSION OF 2 OR MORE CERVICAL VERTEBRAL JOINTS WITH NONAUTOLOGOUS TISSUE SUBSTITUTE, ANTERIOR APPROACH, ANTERIOR COLUMN, OPEN APPROACH: ICD-10-PCS | Performed by: NEUROLOGICAL SURGERY

## 2019-01-01 PROCEDURE — 0RP30JZ REMOVAL OF SYNTHETIC SUBSTITUTE FROM CERVICAL VERTEBRAL DISC, OPEN APPROACH: ICD-10-PCS | Performed by: NEUROLOGICAL SURGERY

## 2019-01-01 PROCEDURE — 72141 MRI NECK SPINE W/O DYE: CPT | Performed by: PHYSICIAN ASSISTANT

## 2019-01-01 PROCEDURE — 36415 COLL VENOUS BLD VENIPUNCTURE: CPT

## 2019-01-01 PROCEDURE — 80053 COMPREHEN METABOLIC PANEL: CPT

## 2019-01-01 PROCEDURE — 99214 OFFICE O/P EST MOD 30 MIN: CPT | Performed by: NEUROLOGICAL SURGERY

## 2019-01-01 PROCEDURE — 70496 CT ANGIOGRAPHY HEAD: CPT | Performed by: PHYSICIAN ASSISTANT

## 2019-01-01 PROCEDURE — 83036 HEMOGLOBIN GLYCOSYLATED A1C: CPT | Performed by: INTERNAL MEDICINE

## 2019-01-01 PROCEDURE — 85610 PROTHROMBIN TIME: CPT

## 2019-01-01 DEVICE — SCREW 3120213 4.0 X 13 SELF TAP FIX
Type: IMPLANTABLE DEVICE | Site: SPINE CERVICAL | Status: FUNCTIONAL
Brand: ATLANTIS® ANTERIOR CERVICAL PLATE SYSTEM

## 2019-01-01 RX ORDER — DEXAMETHASONE SODIUM PHOSPHATE 4 MG/ML
2 VIAL (ML) INJECTION EVERY 24 HOURS
Status: DISCONTINUED | OUTPATIENT
Start: 2019-01-01 | End: 2019-01-01

## 2019-01-01 RX ORDER — DIPHENHYDRAMINE HCL 25 MG
25 CAPSULE ORAL EVERY 4 HOURS PRN
Status: DISCONTINUED | OUTPATIENT
Start: 2019-01-01 | End: 2019-01-01

## 2019-01-01 RX ORDER — ACETAMINOPHEN 160 MG/5ML
650 SOLUTION ORAL EVERY 6 HOURS PRN
Status: DISCONTINUED | OUTPATIENT
Start: 2019-01-01 | End: 2019-01-01

## 2019-01-01 RX ORDER — IPRATROPIUM BROMIDE AND ALBUTEROL SULFATE 2.5; .5 MG/3ML; MG/3ML
3 SOLUTION RESPIRATORY (INHALATION)
Status: DISCONTINUED | OUTPATIENT
Start: 2019-01-01 | End: 2019-01-01

## 2019-01-01 RX ORDER — BISACODYL 5 MG/1
TABLET, DELAYED RELEASE ORAL
Qty: 2 TABLET | Refills: 0 | Status: SHIPPED | OUTPATIENT
Start: 2019-01-01 | End: 2019-01-01 | Stop reason: ALTCHOICE

## 2019-01-01 RX ORDER — SODIUM CHLORIDE 0.9 % (FLUSH) 0.9 %
10 SYRINGE (ML) INJECTION AS NEEDED
Status: DISCONTINUED | OUTPATIENT
Start: 2019-01-01 | End: 2019-01-01

## 2019-01-01 RX ORDER — NALOXONE HYDROCHLORIDE 0.4 MG/ML
80 INJECTION, SOLUTION INTRAMUSCULAR; INTRAVENOUS; SUBCUTANEOUS AS NEEDED
Status: ACTIVE | OUTPATIENT
Start: 2019-01-01 | End: 2019-01-01

## 2019-01-01 RX ORDER — HYDRALAZINE HYDROCHLORIDE 20 MG/ML
10 INJECTION INTRAMUSCULAR; INTRAVENOUS EVERY 4 HOURS PRN
Status: DISCONTINUED | OUTPATIENT
Start: 2019-01-01 | End: 2019-01-01

## 2019-01-01 RX ORDER — POLYETHYLENE GLYCOL 3350 17 G/17G
17 POWDER, FOR SOLUTION ORAL DAILY PRN
Status: DISCONTINUED | OUTPATIENT
Start: 2019-01-01 | End: 2019-01-01

## 2019-01-01 RX ORDER — LABETALOL HYDROCHLORIDE 5 MG/ML
10 INJECTION, SOLUTION INTRAVENOUS EVERY 10 MIN PRN
Status: DISCONTINUED | OUTPATIENT
Start: 2019-01-01 | End: 2019-01-01

## 2019-01-01 RX ORDER — ACETAMINOPHEN 325 MG/1
650 TABLET ORAL EVERY 4 HOURS PRN
Status: DISCONTINUED | OUTPATIENT
Start: 2019-01-01 | End: 2019-01-01

## 2019-01-01 RX ORDER — METOPROLOL SUCCINATE 25 MG/1
TABLET, EXTENDED RELEASE ORAL DAILY
Qty: 30 TABLET | Refills: 2 | Status: SHIPPED | OUTPATIENT
Start: 2019-01-01 | End: 2019-01-01 | Stop reason: SDUPTHER

## 2019-01-01 RX ORDER — METOPROLOL SUCCINATE 50 MG/1
50 TABLET, EXTENDED RELEASE ORAL DAILY
Qty: 90 TABLET | Refills: 1 | Status: SHIPPED | OUTPATIENT
Start: 2019-01-01

## 2019-01-01 RX ORDER — ONDANSETRON 2 MG/ML
4 INJECTION INTRAMUSCULAR; INTRAVENOUS EVERY 4 HOURS PRN
Status: ACTIVE | OUTPATIENT
Start: 2019-01-01 | End: 2019-01-01

## 2019-01-01 RX ORDER — LEVETIRACETAM 100 MG/ML
500 SOLUTION ORAL 2 TIMES DAILY
Status: DISCONTINUED | OUTPATIENT
Start: 2019-01-01 | End: 2019-01-01

## 2019-01-01 RX ORDER — SODIUM PHOSPHATE, DIBASIC AND SODIUM PHOSPHATE, MONOBASIC 7; 19 G/133ML; G/133ML
1 ENEMA RECTAL ONCE AS NEEDED
Status: DISCONTINUED | OUTPATIENT
Start: 2019-01-01 | End: 2019-01-01

## 2019-01-01 RX ORDER — POTASSIUM CHLORIDE 1.5 G/1.77G
40 POWDER, FOR SOLUTION ORAL EVERY 4 HOURS
Status: COMPLETED | OUTPATIENT
Start: 2019-01-01 | End: 2019-01-01

## 2019-01-01 RX ORDER — DEXAMETHASONE SODIUM PHOSPHATE 4 MG/ML
4 VIAL (ML) INJECTION EVERY 24 HOURS
Status: COMPLETED | OUTPATIENT
Start: 2019-01-01 | End: 2019-01-01

## 2019-01-01 RX ORDER — AMLODIPINE BESYLATE 5 MG/1
5 TABLET ORAL 2 TIMES DAILY
Qty: 60 TABLET | Refills: 6 | Status: SHIPPED | OUTPATIENT
Start: 2019-01-01

## 2019-01-01 RX ORDER — BISACODYL 10 MG
10 SUPPOSITORY, RECTAL RECTAL
Status: DISCONTINUED | OUTPATIENT
Start: 2019-01-01 | End: 2019-01-01

## 2019-01-01 RX ORDER — ACETAMINOPHEN 650 MG/1
650 SUPPOSITORY RECTAL EVERY 6 HOURS PRN
Status: DISCONTINUED | OUTPATIENT
Start: 2019-01-01 | End: 2019-01-01

## 2019-01-01 RX ORDER — FAMOTIDINE 20 MG/1
20 TABLET ORAL 2 TIMES DAILY
Status: DISCONTINUED | OUTPATIENT
Start: 2019-01-01 | End: 2019-01-01

## 2019-01-01 RX ORDER — LABETALOL HYDROCHLORIDE 5 MG/ML
5 INJECTION, SOLUTION INTRAVENOUS EVERY 5 MIN PRN
Status: ACTIVE | OUTPATIENT
Start: 2019-01-01 | End: 2019-01-01

## 2019-01-01 RX ORDER — ACETAMINOPHEN 650 MG/1
650 SUPPOSITORY RECTAL EVERY 4 HOURS PRN
Status: DISCONTINUED | OUTPATIENT
Start: 2019-01-01 | End: 2019-01-01

## 2019-01-01 RX ORDER — MORPHINE SULFATE 4 MG/ML
2 INJECTION, SOLUTION INTRAMUSCULAR; INTRAVENOUS EVERY 2 HOUR PRN
Status: DISCONTINUED | OUTPATIENT
Start: 2019-01-01 | End: 2019-01-01

## 2019-01-01 RX ORDER — POLYETHYLENE GLYCOL 3350 17 G/17G
17 POWDER, FOR SOLUTION ORAL DAILY
COMMUNITY

## 2019-01-01 RX ORDER — MANNITOL 20 G/100ML
INJECTION, SOLUTION INTRAVENOUS
Status: DISPENSED
Start: 2019-01-01 | End: 2019-01-01

## 2019-01-01 RX ORDER — HYDROCODONE BITARTRATE AND ACETAMINOPHEN 10; 325 MG/1; MG/1
1 TABLET ORAL EVERY 4 HOURS PRN
Status: DISCONTINUED | OUTPATIENT
Start: 2019-01-01 | End: 2019-01-01

## 2019-01-01 RX ORDER — NIACIN 500 MG/1
500 TABLET, EXTENDED RELEASE ORAL NIGHTLY
Qty: 30 TABLET | Refills: 6 | Status: CANCELLED | OUTPATIENT
Start: 2019-01-01

## 2019-01-01 RX ORDER — HYDROCODONE BITARTRATE AND ACETAMINOPHEN 5; 325 MG/1; MG/1
TABLET ORAL
COMMUNITY
End: 2019-01-01 | Stop reason: ALTCHOICE

## 2019-01-01 RX ORDER — POTASSIUM CHLORIDE 14.9 MG/ML
20 INJECTION INTRAVENOUS ONCE
Status: COMPLETED | OUTPATIENT
Start: 2019-01-01 | End: 2019-01-01

## 2019-01-01 RX ORDER — ONDANSETRON 2 MG/ML
4 INJECTION INTRAMUSCULAR; INTRAVENOUS AS NEEDED
Status: ACTIVE | OUTPATIENT
Start: 2019-01-01 | End: 2019-01-01

## 2019-01-01 RX ORDER — ASPIRIN 81 MG/1
324 TABLET, CHEWABLE ORAL DAILY
Status: DISCONTINUED | OUTPATIENT
Start: 2019-01-01 | End: 2019-01-01

## 2019-01-01 RX ORDER — SIMVASTATIN 20 MG
TABLET ORAL
Qty: 30 TABLET | Refills: 2 | Status: SHIPPED | OUTPATIENT
Start: 2019-01-01 | End: 2019-01-01 | Stop reason: SDUPTHER

## 2019-01-01 RX ORDER — DIAZEPAM 5 MG/1
5 TABLET ORAL EVERY 6 HOURS PRN
Status: DISCONTINUED | OUTPATIENT
Start: 2019-01-01 | End: 2019-01-01

## 2019-01-01 RX ORDER — SIMVASTATIN 20 MG
20 TABLET ORAL NIGHTLY
Qty: 30 TABLET | Refills: 6 | Status: SHIPPED | OUTPATIENT
Start: 2019-01-01

## 2019-01-01 RX ORDER — DIPHENHYDRAMINE HYDROCHLORIDE 50 MG/ML
12.5 INJECTION INTRAMUSCULAR; INTRAVENOUS AS NEEDED
Status: ACTIVE | OUTPATIENT
Start: 2019-01-01 | End: 2019-01-01

## 2019-01-01 RX ORDER — PANTOPRAZOLE SODIUM 40 MG/1
40 TABLET, DELAYED RELEASE ORAL 2 TIMES DAILY
Qty: 60 TABLET | Refills: 3 | Status: SHIPPED | OUTPATIENT
Start: 2019-01-01

## 2019-01-01 RX ORDER — SCOLOPAMINE TRANSDERMAL SYSTEM 1 MG/1
1 PATCH, EXTENDED RELEASE TRANSDERMAL
Status: DISCONTINUED | OUTPATIENT
Start: 2019-01-01 | End: 2019-01-01

## 2019-01-01 RX ORDER — DIAZEPAM 10 MG/1
10 TABLET ORAL EVERY 12 HOURS PRN
Qty: 2 TABLET | Refills: 0 | Status: ON HOLD | OUTPATIENT
Start: 2019-01-01 | End: 2019-01-01 | Stop reason: ALTCHOICE

## 2019-01-01 RX ORDER — PHENYLEPHRINE HCL IN 0.9% NACL 50MG/250ML
PLASTIC BAG, INJECTION (ML) INTRAVENOUS CONTINUOUS PRN
Status: DISCONTINUED | OUTPATIENT
Start: 2019-01-01 | End: 2019-01-01

## 2019-01-01 RX ORDER — LANOLIN ALCOHOL/MO/W.PET/CERES
500 CREAM (GRAM) TOPICAL NIGHTLY
Status: SHIPPED | COMMUNITY
Start: 2019-01-01

## 2019-01-01 RX ORDER — ACETAMINOPHEN 500 MG
1000 TABLET ORAL ONCE
Status: DISCONTINUED | OUTPATIENT
Start: 2019-01-01 | End: 2019-01-01 | Stop reason: HOSPADM

## 2019-01-01 RX ORDER — SODIUM CHLORIDE, SODIUM LACTATE, POTASSIUM CHLORIDE, CALCIUM CHLORIDE 600; 310; 30; 20 MG/100ML; MG/100ML; MG/100ML; MG/100ML
INJECTION, SOLUTION INTRAVENOUS CONTINUOUS
Status: DISCONTINUED | OUTPATIENT
Start: 2019-01-01 | End: 2019-01-01

## 2019-01-01 RX ORDER — LORAZEPAM 2 MG/ML
1 INJECTION INTRAMUSCULAR EVERY 4 HOURS PRN
Status: DISCONTINUED | OUTPATIENT
Start: 2019-01-01 | End: 2019-01-01

## 2019-01-01 RX ORDER — ACETAMINOPHEN 325 MG/1
650 TABLET ORAL EVERY 6 HOURS PRN
Status: DISCONTINUED | OUTPATIENT
Start: 2019-01-01 | End: 2019-01-01

## 2019-01-01 RX ORDER — HEPARIN SODIUM 5000 [USP'U]/ML
5000 INJECTION, SOLUTION INTRAVENOUS; SUBCUTANEOUS EVERY 8 HOURS SCHEDULED
Status: DISCONTINUED | OUTPATIENT
Start: 2019-01-01 | End: 2019-01-01

## 2019-01-01 RX ORDER — METOPROLOL SUCCINATE 25 MG/1
25 TABLET, EXTENDED RELEASE ORAL DAILY
Qty: 30 TABLET | Refills: 6 | Status: SHIPPED | OUTPATIENT
Start: 2019-01-01 | End: 2019-01-01 | Stop reason: DRUGHIGH

## 2019-01-01 RX ORDER — HYDROCODONE BITARTRATE AND ACETAMINOPHEN 10; 325 MG/1; MG/1
2 TABLET ORAL EVERY 4 HOURS PRN
Status: DISCONTINUED | OUTPATIENT
Start: 2019-01-01 | End: 2019-01-01

## 2019-01-01 RX ORDER — LORAZEPAM 2 MG/ML
1 INJECTION INTRAMUSCULAR ONCE
Status: COMPLETED | OUTPATIENT
Start: 2019-01-01 | End: 2019-01-01

## 2019-01-01 RX ORDER — DEXMEDETOMIDINE HYDROCHLORIDE 4 UG/ML
INJECTION, SOLUTION INTRAVENOUS CONTINUOUS
Status: DISCONTINUED | OUTPATIENT
Start: 2019-01-01 | End: 2019-01-01

## 2019-01-01 RX ORDER — MORPHINE SULFATE 4 MG/ML
2 INJECTION, SOLUTION INTRAMUSCULAR; INTRAVENOUS ONCE
Status: COMPLETED | OUTPATIENT
Start: 2019-01-01 | End: 2019-01-01

## 2019-01-01 RX ORDER — DOCUSATE SODIUM 100 MG/1
100 CAPSULE, LIQUID FILLED ORAL 2 TIMES DAILY
Status: DISCONTINUED | OUTPATIENT
Start: 2019-01-01 | End: 2019-01-01

## 2019-01-01 RX ORDER — DIPHENHYDRAMINE HYDROCHLORIDE 50 MG/ML
25 INJECTION INTRAMUSCULAR; INTRAVENOUS EVERY 4 HOURS PRN
Status: DISCONTINUED | OUTPATIENT
Start: 2019-01-01 | End: 2019-01-01

## 2019-01-01 RX ORDER — MORPHINE SULFATE 4 MG/ML
1 INJECTION, SOLUTION INTRAMUSCULAR; INTRAVENOUS EVERY 2 HOUR PRN
Status: DISCONTINUED | OUTPATIENT
Start: 2019-01-01 | End: 2019-01-01

## 2019-01-01 RX ORDER — BACITRACIN 50000 [USP'U]/1
INJECTION, POWDER, LYOPHILIZED, FOR SOLUTION INTRAMUSCULAR AS NEEDED
Status: DISCONTINUED | OUTPATIENT
Start: 2019-01-01 | End: 2019-01-01 | Stop reason: HOSPADM

## 2019-01-01 RX ORDER — MAGNESIUM SULFATE HEPTAHYDRATE 40 MG/ML
2 INJECTION, SOLUTION INTRAVENOUS ONCE
Status: COMPLETED | OUTPATIENT
Start: 2019-01-01 | End: 2019-01-01

## 2019-01-01 RX ORDER — METHYLPREDNISOLONE 4 MG/1
TABLET ORAL
Qty: 1 PACKAGE | Refills: 0 | Status: SHIPPED | OUTPATIENT
Start: 2019-01-01 | End: 2019-01-01 | Stop reason: ALTCHOICE

## 2019-01-01 RX ORDER — SENNOSIDES 8.6 MG
17.2 TABLET ORAL NIGHTLY
Status: DISCONTINUED | OUTPATIENT
Start: 2019-01-01 | End: 2019-01-01

## 2019-01-01 RX ORDER — HYDROMORPHONE HYDROCHLORIDE 1 MG/ML
0.2 INJECTION, SOLUTION INTRAMUSCULAR; INTRAVENOUS; SUBCUTANEOUS EVERY 2 HOUR PRN
Status: DISCONTINUED | OUTPATIENT
Start: 2019-01-01 | End: 2019-01-01

## 2019-01-01 RX ORDER — PANTOPRAZOLE SODIUM 40 MG/1
40 TABLET, DELAYED RELEASE ORAL
COMMUNITY

## 2019-01-01 RX ORDER — HYDROCODONE BITARTRATE AND ACETAMINOPHEN 5; 325 MG/1; MG/1
2 TABLET ORAL AS NEEDED
Status: DISCONTINUED | OUTPATIENT
Start: 2019-01-01 | End: 2019-01-01

## 2019-01-01 RX ORDER — CHLORHEXIDINE GLUCONATE 0.12 MG/ML
15 RINSE ORAL
Status: DISCONTINUED | OUTPATIENT
Start: 2019-01-01 | End: 2019-01-01

## 2019-01-01 RX ORDER — DIAZEPAM 5 MG/ML
5 INJECTION, SOLUTION INTRAMUSCULAR; INTRAVENOUS ONCE AS NEEDED
Status: ACTIVE | OUTPATIENT
Start: 2019-01-01 | End: 2019-01-01

## 2019-01-01 RX ORDER — DIAZEPAM 5 MG/ML
5 INJECTION, SOLUTION INTRAMUSCULAR; INTRAVENOUS ONCE
Status: DISCONTINUED | OUTPATIENT
Start: 2019-01-01 | End: 2019-01-01

## 2019-01-01 RX ORDER — METOCLOPRAMIDE HYDROCHLORIDE 5 MG/ML
10 INJECTION INTRAMUSCULAR; INTRAVENOUS EVERY 6 HOURS PRN
Status: DISCONTINUED | OUTPATIENT
Start: 2019-01-01 | End: 2019-01-01

## 2019-01-01 RX ORDER — ORPHENADRINE CITRATE 30 MG/ML
30 INJECTION INTRAMUSCULAR; INTRAVENOUS EVERY 6 HOURS
Status: DISCONTINUED | OUTPATIENT
Start: 2019-01-01 | End: 2019-01-01

## 2019-01-01 RX ORDER — SODIUM CHLORIDE, SODIUM LACTATE, POTASSIUM CHLORIDE, CALCIUM CHLORIDE 600; 310; 30; 20 MG/100ML; MG/100ML; MG/100ML; MG/100ML
INJECTION, SOLUTION INTRAVENOUS CONTINUOUS
Status: DISCONTINUED | OUTPATIENT
Start: 2019-01-01 | End: 2019-01-01 | Stop reason: HOSPADM

## 2019-01-01 RX ORDER — AMLODIPINE BESYLATE 5 MG/1
5 TABLET ORAL 2 TIMES DAILY
COMMUNITY

## 2019-01-01 RX ORDER — ATROPINE SULFATE 10 MG/ML
1 SOLUTION/ DROPS OPHTHALMIC ONCE
Status: COMPLETED | OUTPATIENT
Start: 2019-01-01 | End: 2019-01-01

## 2019-01-01 RX ORDER — ETOMIDATE 2 MG/ML
INJECTION INTRAVENOUS
Status: COMPLETED
Start: 2019-01-01 | End: 2019-01-01

## 2019-01-01 RX ORDER — HYDROCODONE BITARTRATE AND ACETAMINOPHEN 10; 325 MG/1; MG/1
TABLET ORAL
COMMUNITY

## 2019-01-01 RX ORDER — ASPIRIN 300 MG
300 SUPPOSITORY, RECTAL RECTAL DAILY
Status: DISCONTINUED | OUTPATIENT
Start: 2019-01-01 | End: 2019-01-01

## 2019-01-01 RX ORDER — POTASSIUM CHLORIDE 29.8 MG/ML
40 INJECTION INTRAVENOUS ONCE
Status: COMPLETED | OUTPATIENT
Start: 2019-01-01 | End: 2019-01-01

## 2019-01-01 RX ORDER — ACETAMINOPHEN 10 MG/ML
INJECTION, SOLUTION INTRAVENOUS
Status: DISCONTINUED | OUTPATIENT
Start: 2019-01-01 | End: 2019-01-01 | Stop reason: HOSPADM

## 2019-01-01 RX ORDER — GLYCOPYRROLATE 0.2 MG/ML
0.4 INJECTION, SOLUTION INTRAMUSCULAR; INTRAVENOUS ONCE
Status: COMPLETED | OUTPATIENT
Start: 2019-01-01 | End: 2019-01-01

## 2019-01-01 RX ORDER — METOCLOPRAMIDE HYDROCHLORIDE 5 MG/ML
10 INJECTION INTRAMUSCULAR; INTRAVENOUS AS NEEDED
Status: ACTIVE | OUTPATIENT
Start: 2019-01-01 | End: 2019-01-01

## 2019-01-01 RX ORDER — HYDROMORPHONE HYDROCHLORIDE 1 MG/ML
0.8 INJECTION, SOLUTION INTRAMUSCULAR; INTRAVENOUS; SUBCUTANEOUS EVERY 2 HOUR PRN
Status: DISCONTINUED | OUTPATIENT
Start: 2019-01-01 | End: 2019-01-01

## 2019-01-01 RX ORDER — SODIUM, POTASSIUM,MAG SULFATES 17.5-3.13G
SOLUTION, RECONSTITUTED, ORAL ORAL
Qty: 12 OZ | Refills: 0 | Status: SHIPPED | OUTPATIENT
Start: 2019-01-01 | End: 2019-01-01 | Stop reason: ALTCHOICE

## 2019-01-01 RX ORDER — DEXAMETHASONE SODIUM PHOSPHATE 4 MG/ML
4 VIAL (ML) INJECTION EVERY 12 HOURS
Status: DISCONTINUED | OUTPATIENT
Start: 2019-01-01 | End: 2019-01-01

## 2019-01-01 RX ORDER — LABETALOL HYDROCHLORIDE 5 MG/ML
INJECTION, SOLUTION INTRAVENOUS
Status: DISPENSED
Start: 2019-01-01 | End: 2019-01-01

## 2019-01-01 RX ORDER — HYDROCODONE BITARTRATE AND ACETAMINOPHEN 5; 325 MG/1; MG/1
1 TABLET ORAL AS NEEDED
Status: DISCONTINUED | OUTPATIENT
Start: 2019-01-01 | End: 2019-01-01

## 2019-01-01 RX ORDER — FAMOTIDINE 10 MG/ML
20 INJECTION, SOLUTION INTRAVENOUS 2 TIMES DAILY
Status: DISCONTINUED | OUTPATIENT
Start: 2019-01-01 | End: 2019-01-01

## 2019-01-01 RX ORDER — MAGNESIUM OXIDE 400 MG (241.3 MG MAGNESIUM) TABLET
400 TABLET ONCE
Status: COMPLETED | OUTPATIENT
Start: 2019-01-01 | End: 2019-01-01

## 2019-01-01 RX ORDER — ATORVASTATIN CALCIUM 40 MG/1
40 TABLET, FILM COATED ORAL NIGHTLY
Status: DISCONTINUED | OUTPATIENT
Start: 2019-01-01 | End: 2019-01-01

## 2019-01-01 RX ORDER — ALPRAZOLAM 0.25 MG/1
TABLET ORAL
COMMUNITY
End: 2019-01-01 | Stop reason: ALTCHOICE

## 2019-01-01 RX ORDER — SIMVASTATIN 20 MG
20 TABLET ORAL NIGHTLY
COMMUNITY

## 2019-01-01 RX ORDER — HYDROMORPHONE HYDROCHLORIDE 1 MG/ML
0.4 INJECTION, SOLUTION INTRAMUSCULAR; INTRAVENOUS; SUBCUTANEOUS EVERY 2 HOUR PRN
Status: DISCONTINUED | OUTPATIENT
Start: 2019-01-01 | End: 2019-01-01

## 2019-01-01 RX ORDER — ATORVASTATIN CALCIUM 80 MG/1
80 TABLET, FILM COATED ORAL NIGHTLY
Status: DISCONTINUED | OUTPATIENT
Start: 2019-01-01 | End: 2019-01-01

## 2019-01-01 RX ORDER — HYDROCODONE BITARTRATE AND ACETAMINOPHEN 10; 325 MG/1; MG/1
1 TABLET ORAL EVERY 4 HOURS PRN
Qty: 90 TABLET | Refills: 0 | Status: SHIPPED | OUTPATIENT
Start: 2019-01-01

## 2019-01-01 RX ORDER — MODAFINIL 100 MG/1
100 TABLET ORAL DAILY
Status: DISCONTINUED | OUTPATIENT
Start: 2019-01-01 | End: 2019-01-01

## 2019-01-01 RX ORDER — HYDROMORPHONE HYDROCHLORIDE 1 MG/ML
0.4 INJECTION, SOLUTION INTRAMUSCULAR; INTRAVENOUS; SUBCUTANEOUS EVERY 5 MIN PRN
Status: ACTIVE | OUTPATIENT
Start: 2019-01-01 | End: 2019-01-01

## 2019-01-01 RX ORDER — DEXAMETHASONE SODIUM PHOSPHATE 4 MG/ML
4 VIAL (ML) INJECTION EVERY 6 HOURS
Status: COMPLETED | OUTPATIENT
Start: 2019-01-01 | End: 2019-01-01

## 2019-01-01 RX ORDER — PREDNISONE 10 MG/1
10 TABLET ORAL DAILY
Qty: 30 TABLET | Refills: 0 | Status: SHIPPED | OUTPATIENT
Start: 2019-01-01 | End: 2019-01-01 | Stop reason: ALTCHOICE

## 2019-01-01 RX ORDER — CYCLOBENZAPRINE HCL 10 MG
TABLET ORAL PRN
COMMUNITY

## 2019-01-01 RX ORDER — SODIUM CHLORIDE 9 MG/ML
INJECTION, SOLUTION INTRAVENOUS CONTINUOUS
Status: DISCONTINUED | OUTPATIENT
Start: 2019-01-01 | End: 2019-01-01

## 2019-01-01 RX ORDER — HYDROCODONE BITARTRATE AND ACETAMINOPHEN 10; 325 MG/1; MG/1
1 TABLET ORAL EVERY 4 HOURS PRN
Qty: 90 TABLET | Refills: 0 | Status: SHIPPED | OUTPATIENT
Start: 2019-01-01 | End: 2019-01-01

## 2019-01-01 ASSESSMENT — ENCOUNTER SYMPTOMS
SHORTNESS OF BREATH: 0
ENDOCRINE NEGATIVE: 1
SINUS PRESSURE: 0
SORE THROAT: 0
RHINORRHEA: 0
LIGHT-HEADEDNESS: 0
DIARRHEA: 0
CONFUSION: 0
SPEECH DIFFICULTY: 0
SPEECH DIFFICULTY: 0
ABDOMINAL DISTENTION: 0
COUGH: 0
DIAPHORESIS: 0
FATIGUE: 0
BACK PAIN: 0
CONSTIPATION: 0
CHILLS: 0
SEIZURES: 0
ADENOPATHY: 0
WEAKNESS: 1
SLEEP DISTURBANCE: 0
COLOR CHANGE: 0
TREMORS: 0
WEAKNESS: 1
ABDOMINAL PAIN: 0
APPETITE CHANGE: 0
VOMITING: 0
NAUSEA: 0
HEADACHES: 0
BLOOD IN STOOL: 0
DIARRHEA: 0
HEADACHES: 0
NERVOUS/ANXIOUS: 0
NUMBNESS: 0
CONSTIPATION: 0
FATIGUE: 0
ANAL BLEEDING: 0
COUGH: 0
ACTIVITY CHANGE: 1
FEVER: 0
EYE REDNESS: 0
COUGH: 0
CHEST TIGHTNESS: 0
BACK PAIN: 0
EYES NEGATIVE: 1
WHEEZING: 0
FATIGUE: 0
ADENOPATHY: 0
EYE DISCHARGE: 0
RHINORRHEA: 0
BRUISES/BLEEDS EASILY: 0
HEADACHES: 0
EYE PAIN: 0
CHEST TIGHTNESS: 0
PSYCHIATRIC NEGATIVE: 1
CHOKING: 0
TREMORS: 0
DIAPHORESIS: 0
BLOOD IN STOOL: 0
NAUSEA: 0
SINUS PAIN: 0
DIZZINESS: 0
RECTAL PAIN: 0
CHEST TIGHTNESS: 0
BLOOD IN STOOL: 0
SORE THROAT: 0
CHILLS: 0
DIAPHORESIS: 0
WHEEZING: 0
SHORTNESS OF BREATH: 0
DIARRHEA: 0
APPETITE CHANGE: 0
UNEXPECTED WEIGHT CHANGE: 0
NUMBNESS: 1
CHILLS: 0
SHORTNESS OF BREATH: 0
VOMITING: 0
NAUSEA: 0
SORE THROAT: 0
ABDOMINAL PAIN: 0
DIZZINESS: 0
FEVER: 0
BRUISES/BLEEDS EASILY: 0
CONSTIPATION: 0
VOMITING: 0

## 2019-01-01 ASSESSMENT — PAIN SCALES - GENERAL: PAINLEVEL: 5-6
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
## 2019-04-05 NOTE — PATIENT INSTRUCTIONS
Refill policies:    • Allow 2-3 business days for refills; controlled substances may take longer.   • Contact your pharmacy at least 5 days prior to running out of medication and have them send an electronic request or submit request through the “request re been approved by your insurer. Depending on your insurance carrier, approval may take 3-10 days. It is highly recommended patients contact their insurance carrier directly to determine coverage.   If test is done without insurance authorization, patient ma collar  4.   Recommend he consider a C5-6 disc removal and fusion possibly C4-5 as well

## 2019-04-05 NOTE — PROGRESS NOTES
Neurosurgery follow up        1. C5-6 ADR Dr Milton Padilla 10/2/17  2. Left shoulder surgery March 2018 Dr. Radha Nguyen is a 66year old  male here in follow-up.   Patient gives a history of being overall stable with his clavicle with a deep ache into the left arm.  complains of thoracic tightness. Complains of lower back pain and tightness and shooting pain. He gets occasional shooting leg pain. He feels his left hip flexion is weak again.   He is getting tingling into episodes of spasm in his diaphragm. His walking has improved to 2 miles. Overall, he feels 15% better.     Last Hx:  here for cervical pain left arm pain and weakness and left leg weakness.     Patient gives a history of being a  for [employer] Plumbi explained his therapist how much pain he was having which was atypical from his first experience. The pain progressed into left bicipital throbbing and aching and burning. Then started to shoot down into his left second through fourth fingers.   He was gi spine his limited rotation to the right and left and pain on extension  SKIN: Warm, dry, without rashes. Left fourth digit has a scar consistent with his surgery. Anterior cervical incision healed. Voice intact.   Swallowing intact    NEUROLOGICAL:  This of the upper plate relative to the lower plate on flexion. Very mild kyphosis at C4-5 on flexion, C4-5 retrolisthesis on extension        MRI lumbar spine 7/25/17 L4-5-S1 spondylosis without herniation os significant stenosis       ASSESSMENT:  1.   Cervic
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
## 2019-06-06 NOTE — TELEPHONE ENCOUNTER
Surgery order placed. BGS and collar order also placed. Letter faxed to PCP office. Fax #: [phone].

## 2019-06-06 NOTE — PROGRESS NOTES
Neurosurgery follow up        1. C5-6 ADR Dr Lyssa Penaloza 10/2/17  2. Left shoulder surgery March 2018 Dr. Bella Hurtado is a 66year old LH male here in follow-up. Increased neck pain and weakness left arm.  More trouble w complains of pain radiating down the arm into the fourth finger of the left hand which is a 6-7/10. Feels like his left  is getting weaker. Feels like his left tricep is getting weak.   He has had some pain over the left clavicle with a deep ache into preoperative condition. Last hx:  here in follow up after MRI CS and LS spine. He continues with left cervical radiculopathy. Medrol helped. PT is helping. He still get shooting left arm radiculopathy.  He has had a few episodes of spasm in his diaphra without complication. After the surgery he felt a difference in his left hand he had sharp stabbing pain in his left hand that he did experience from his first surgery.   He was started on physical therapy immediately he explained his therapist how much pa 10-point system was reviewed. Pertinent positives and negatives are noted in HPI.       PHYSICAL EXAMINATION:  GENERAL:  Patient is in no acute distress.   HEENT:  Normocephalic, he is stiff with motion of the cervical spine his limited rotation to the rig of the cervical spine 7/13/17. Report was read as normal.  C5-6 prosthesis is healed to the implant well. There is a slight anterior listhesis of the upper plate relative to the lower plate on flexion.   Very mild kyphosis at C4-5 on flexion, C4-5 retroli

## 2019-06-06 NOTE — PROGRESS NOTES
Pt is here for follow up for SX Discussion. Maysel collar: Daily       Pt states that he is still the same since LOV. No new symptomes to be reported.

## 2019-06-06 NOTE — TELEPHONE ENCOUNTER
You are scheduled for C5-6 ADR removal, C5-6-7 ACDF on 10/2/19 with Kathleen Ness .     Pre-op instructions discussed with patient and surgical packet provided:    · You will need to contact the Pre-admission department at [phone] to schedule your pre-op te fitting. Fidelina Durbin may be reached at phone #: [phone]. · You may need an external bone growth stimulator. If ordered for your surgery DJO will contact you either before or after your surgery.   · You may also need SCD's (Sequential Compression Device) to u

## 2019-06-06 NOTE — PATIENT INSTRUCTIONS
Refill policies:    • Allow 2-3 business days for refills; controlled substances may take longer.   • Contact your pharmacy at least 5 days prior to running out of medication and have them send an electronic request or submit request through the “request re been approved by your insurer. Depending on your insurance carrier, approval may take 3-10 days. It is highly recommended patients contact their insurance carrier directly to determine coverage.   If test is done without insurance authorization, patient ma flexeril  3. Vista cervical collar  4. Recommend he consider a C5-6 disc removal and C5-6-7 ACDF orders placed       You are scheduled for ACDF on 10/2/19 with Carroll Salazar .     Pre-op instructions discussed with patient and surgical packet provided:    · Ulises Sanchez need an Aspen cervical collar. If ordered for your surgery PAULY Zhang will contact you to set up a fitting. Kathryn Torres may be reached at phone #: [phone]. · You may need an external bone growth stimulator.  If ordered for your surgery DJO will contact you

## 2019-06-13 PROBLEM — E66.9 OBESITY: Status: ACTIVE | Noted: 2017-03-13

## 2019-06-13 PROBLEM — M48.02 CERVICAL SPINAL STENOSIS: Status: ACTIVE | Noted: 2019-01-01

## 2019-06-13 PROBLEM — F41.9 ANXIETY: Status: ACTIVE | Noted: 2019-01-01

## 2019-06-13 PROBLEM — M54.12 CERVICAL RADICULOPATHY, CHRONIC: Status: ACTIVE | Noted: 2017-09-07

## 2019-06-13 PROBLEM — M21.372 FOOT DROP, LEFT: Status: ACTIVE | Noted: 2017-09-07

## 2019-06-28 NOTE — TELEPHONE ENCOUNTER
Doctor to Doctor request for EMG report to be faxed to [phone]; report faxed, confirmation received

## 2019-07-03 PROBLEM — R13.19 ESOPHAGEAL DYSPHAGIA: Status: ACTIVE | Noted: 2019-01-01

## 2019-07-03 PROBLEM — Z12.11 SPECIAL SCREENING FOR MALIGNANT NEOPLASMS, COLON: Status: ACTIVE | Noted: 2019-01-01

## 2019-07-03 PROBLEM — K21.9 CHRONIC GERD: Status: ACTIVE | Noted: 2019-01-01

## 2019-07-03 PROBLEM — K59.00 CONSTIPATION: Status: ACTIVE | Noted: 2019-01-01

## 2019-09-16 NOTE — TELEPHONE ENCOUNTER
Gustavo Friday 9/27      Thank you, please clarify: 09/30 is a Monday, should this be Friday 09/27, or Monday, 09/30?

## 2019-09-26 NOTE — TELEPHONE ENCOUNTER
PCP Office called and spoke with Ashutosh Gandara. Office to send over clearance letter for surgery which will be on 10/02/19.

## 2019-09-27 NOTE — PROGRESS NOTES
Patient here for collar fitting. LOV 6/6/19    Patient states he is having pain 3/10    Patient states his pain is located in left shoulder and neck.

## 2019-09-27 NOTE — PATIENT INSTRUCTIONS
PLAN:  1. Follow up 2 weeks postop  2. Medication: Norco  3. Vista multi-post cervical collar dispensed  4. He is scheduled for a CT 5 6 artificial disc removal and SC 5-7 ACDF October 2.   However he is having weakness in C5 dermatome will discuss with ТАТЬЯНА

## 2019-09-27 NOTE — PROGRESS NOTES
Neurosurgery follow up        1. C5-6 ADR Dr Jennifer Montanez 10/2/17  2. Left shoulder surgery March 2018 Dr. Braulio Edwards is a 66year old LH male here in follow-up for a presurgical check.   He is complaining of cervical pa well.    Last history: 10/4/18  Medrol dose packed helped left shoulder and arm. He is here to discuss CS XR and MRI. Left  weak again. Left leg weakness. Walking left leg harder. Pain is better than before.     Last hx:  Since his last visit he feels l Turning to the right gives him most of his neck pain. He is taking Norco mostly at night Flexeril twice a day. His ability to walk and difficulty using the left leg especially climbing up hills of slight incline has gotten worse.   Overall he was better a until September he was placed in work conditioning program.     September 2016 he saw Dr. Evi Suh for his left finger disability.   At that time he had no neck pain no arm pain no numbness or tingling or weakness he had focal disability in his left fourth has quit smoking. He does not have any smokeless tobacco history on file.  He reports that he drinks about 1.2 oz of alcohol per week .     ALLERGIES:     Penicillins             Hives     MEDICATIONS:     No current outpatient prescriptions on file prior t limited CSF residual at C4-5 with a slight anterior spur. C6-7 stenosis    X-rays of the cervical spine from 4/5/19 show osteophyte formation of the superior plate of V8-6.   On flexion is a slight translation of 1.8 mm on flexion which improves on extensi symptoms, and the risk of death. The patient wishes to pursue surgical intervention.         Sirena Remy M.S., PA-C  [facility]  [address] [facility], [facility] Omar Quinonez, [address] [place] [address] Rd  [phone]

## 2019-09-30 NOTE — TELEPHONE ENCOUNTER
Patient has his MRI scheduled for 5 PM today.   Is requesting Valium for the MRI    Valium approved and sent to the pharmacy

## 2019-09-30 NOTE — TELEPHONE ENCOUNTER
Started PA through [address] [facility] is processing your inquiry and will respond shortly with next steps.

## 2019-09-30 NOTE — TELEPHONE ENCOUNTER
Patient has Medicare Primary -No Prior Authorization is Required for MRI or XRay if Medicare is Primary.

## 2019-09-30 NOTE — TELEPHONE ENCOUNTER
Dr Keegan Montenegro.  Would like XR CS with flexion and extension  MRI cervical  Before surgery  Patient called and made aware of need for imaging

## 2019-09-30 NOTE — TELEPHONE ENCOUNTER
Pt states Abraham told him because of Medicare Part B that another form needs to be filled out because of the # of pills instead of 7 day supply. Please call patient when this is cleared up.

## 2019-09-30 NOTE — TELEPHONE ENCOUNTER
Per FREEDOM Jain: [facility] is schedule for C5-7 ACDF. He has new weakness in C4-5. Scheduled for 10/2/19. I think C4-5 needs to be included. \"    Will need to check procedure with .

## 2019-10-01 NOTE — TELEPHONE ENCOUNTER
Message below noted. OR was sent a case change request and also reviewed over the phone. Nothing further needed for upcoming surgery.

## 2019-10-02 PROBLEM — M47.12 OSTEOARTHRITIS OF CERVICAL SPINE WITH MYELOPATHY: Status: ACTIVE | Noted: 2019-01-01

## 2019-10-02 NOTE — BRIEF OP NOTE
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

## 2019-10-02 NOTE — ANESTHESIA PREPROCEDURE EVALUATION
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   Exercise tolerance: good     MET: >4      (+) hypertension       (-) past MI Component Value Date     09/18/2019    K 4.6 09/18/2019     09/18/2019    CO2 31.0 09/18/2019    BUN 17 09/18/2019    CREATSERUM 1.07 09/18/2019    GLU 96 09/18/2019    CA 8.7 09/18/2019     Lab Results   Component Value Date    INR 1.03 09/1

## 2019-10-02 NOTE — H&P
Neurosurgery   H+P        1. C5-6 ADR Dr Mariia Box 10/2/17  2. Left shoulder surgery March 2018 Dr. Olena Arnold is a 66year [address] male being admitted forC5-6 ADR removal and C4-5-6-7 ACDF.   He is complaining of cerv better today than he did yesterday. The Flexeril definitely helped as well.     Last history: 10/4/18  Medrol dose packed helped left shoulder and arm. He is here to discuss CS XR and MRI. Left  weak again. Left leg weakness. Walking left leg harder. that goes into the left trapezius muscle and into the shoulder mostly at night. Turning to the right gives him most of his neck pain. He is taking Norco mostly at night Flexeril twice a day.   His ability to walk and difficulty using the left leg especial Abdirashid Pop was diagnosed with another rupture of the A2 pulley was placed in rehab from July until September he was placed in work conditioning program.     September 2016 he saw Dr. Farhad Cardona for his left finger disability.  At that time he had no neck pain no ar    FAMILY HISTORY:  family history is not on file.     SOCIAL HISTORY:   reports that he has quit smoking. He does not have any smokeless tobacco history on file.  He reports that he drinks about 1.2 oz of alcohol per week .     ALLERGIES:     Penicillins 15.6           DIAGNOSTIC DATA:  EMG of the upper extremity 4/18/17 negative      IMAGING:  XR CS spine 9/30/19 C4-5 retrolisthesis. C5-6 ADR. C6-7 spondylosis. MRI CS 9/30/19 C6-7 spinal stenosis. C5-6 ADR.  C4-5 artifiact    Cervical myelogram s surgery, unexpected outcomes from surgery, and associated risks with any surgical procedure. To include infection, neurological injury, failure to resolve pain or symptoms, and the risk of death.  The patient wishes to pursue surgical intervention.

## 2019-10-02 NOTE — ANESTHESIA POSTPROCEDURE EVALUATION
[facility] Patient Status:  Inpatient   Age/Gender 66year old male MRN [id number]   Location [address] Attending Sarah Sue MD   Hosp Day # 0 PCP JENN Claudio       Anesthesia Post-op Note    Procedure(s):

## 2019-10-03 NOTE — OCCUPATIONAL THERAPY NOTE
OCCUPATIONAL THERAPY EVALUATION - INPATIENT     Room Number: 5337/2035-G  Evaluation Date: 10/3/2019  Type of Evaluation: Initial  Presenting Problem: C4-C7 ACDF, CVA    Physician Order: IP Consult to Occupational Therapy  Reason for Therapy: ADL/IADL Dysf OBJECTIVE  Precautions: Spine;Cervical brace; Aspiration  Fall Risk: High fall risk    WEIGHT BEARING RESTRICTION  Weight Bearing Restriction: None                PAIN ASSESSMENT  Rating: Unable to rate          COGNITION  Overall Cognitive Status:  Imp staff;Needs met;Call light within reach;RN aware of session/findings; All patient questions and concerns addressed;SCDs in place; Alarm set; Family present    ASSESSMENT     Patient is a 66year old male admitted on 10/2/2019 for ACDF.  Complete medical histor Plan: Balance activities; Energy conservation/work simplification techniques;ADL training;IADL training;Continued evaluation; Compensatory technique education; Neuromuscluar reeducation;Equipment eval/education; Fine motor coordination activities; Patient/Famil

## 2019-10-03 NOTE — PROGRESS NOTES
[facility]  Progress Note     [facility]o [place] Patient Status:  Inpatient    1955 MRN [id number]   [facility] 6NE-A Attending Christy Jimenes MD   Hosp Day # 1 PCP JENN Doherty   Consulted by dr. Jayme Rankin for med [facility] T4F 1.2 10/03/2019    T3F 1.71 10/03/2019       Imaging: Imaging data reviewed in Epic.     Medications:   • atorvastatin  80 mg Oral Nightly   • aspirin  300 mg Rectal Daily   • potassium chloride 40mEq IVPB (peripheral line)  40 mEq Intravenous Once

## 2019-10-03 NOTE — PLAN OF CARE
Received patient still lethargic from anesthesia only moving to pain stimuli, non-verbal. Dr. Madhavi Mensah and anesthesia at bedside. Taken for STAT MRI at 2200, patient able to move left extremities, more alert, attempting to talk.  Requiring occasional NT suctio

## 2019-10-03 NOTE — CONSULTS
[facility]    Report of Consultation    Rizwana Doty Patient Status:  Inpatient    1955 MRN [id number]   [facility] 6NE-A Attending Jr Foss MD   [facility] Day # 1 PCP Carol Ann Alvares     Date of Admission:  10/2/2 reports that he does not use drugs.     Allergies:    Penicillins             HIVES    Medications:    Current Facility-Administered Medications:   •  acetaminophen (TYLENOL) tab 650 mg, 650 mg, Oral, Q4H PRN **OR** acetaminophen (TYLENOL) 650 MG rectal sup acetaminophen (TYLENOL) tab 650 mg, 650 mg, Oral, Q4H PRN **OR** HYDROcodone-acetaminophen (NORCO)  MG per tab 1 tablet, 1 tablet, Oral, Q4H PRN **OR** HYDROcodone-acetaminophen (NORCO)  MG per tab 2 tablet, 2 tablet, Oral, Q4H PRN  •  HYDROmor Data:  Diagnostics:  EKG: as above  Echo: as above  Stress Test:   Cath:   CTA Chest:   CXR: clear    Labs:   Lab Results   Component Value Date    WBC 15.2 10/03/2019    WBC 15.2 10/03/2019    RBC 4.66 10/03/2019    RBC 4.66 10/03/2019    HGB 14.2 10/03/2

## 2019-10-03 NOTE — PROGRESS NOTES
[facility]  Neurocritical Care APRN Progress Note    NAME: Hayes Belle - ROOM: [facility]3319-I - MRN: [id number] - Age: 66year old - :1955    History Of Present Illness:  Hayes Belle is a 66year old male with PMHx si glasses       Social Hx:  Social History    Tobacco Use      Smoking status: Former Smoker        Packs/day: 0.50        Years: 6.00        Pack years: 3        Quit date: 1980        Years since quittin.0      Smokeless tobacco: Never Used    Al bilaterally. He is moving left spontaneously and right only to painful stimuli. He has a cough and weak gag. Data this admission:  Xr Cervical Spine (4views) (cpt=72050)    Result Date: 9/30/2019  CONCLUSION:  1.  There is slight retrolisthesis of C4 on transverse foramen with complete opacification of the left V3 and V4 segments. 3. No acute intracranial process. If there is clinical concern for acute ischemia/infarction, an MRI of the brain would be recommended for further evaluation.    Critical results Practitioner  Spec [id number]

## 2019-10-03 NOTE — PROGRESS NOTES
Neurosurgery Attending    Patient is taking exceptionally long to wake up from anesthesia. He has eye opening but no movement to command. He has purposeful movement on the left side to pain but the right arm seems to be extending.     He had a intraoperat

## 2019-10-03 NOTE — PROGRESS NOTES
Anesthesia extubation note  Patient with spontaneous ventilation via ETT and normal ABG; VSS. Discussed trial of extubation with Dr. Dov Lima since weaning essentially uneventful from respiratory standpoint.   He remains somewhat slow on following commands bu
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
## 2019-10-03 NOTE — PHYSICAL THERAPY NOTE
PHYSICAL THERAPY EVALUATION - INPATIENT     Room Number: 7577/1339-F  Evaluation Date: 10/3/2019  Type of Evaluation: Initial  Physician Order: PT Eval and Treat    Presenting Problem: acute CVA  Reason for Therapy: Mobility Dysfunction and Discharge self-stated goal is not stated    OBJECTIVE  Precautions: Spine;Cervical brace; Aspiration  Fall Risk: High fall risk    WEIGHT BEARING RESTRICTION  Weight Bearing Restriction: None                PAIN ASSESSMENT  Rating: Other (Comment)(unable to assess) placed in chair position of bed. He is unable to follow simple commands. Pt participated in PROM BLEs in all planes. Pt is alert and tracking. DIscussed with RN recommendation for PRAFOs to maintain joint integrity and flexibility.      Exercise/Education Pr re-educate;Strengthening;Range of motion  Rehab Potential : Fair  Frequency (Obs): 5x/week  Number of Visits to Meet Established Goals: 10      CURRENT GOALS    Goal #1 Pt will participate in bed mobility to establish functional goal.    Goal #2 Pt will fo

## 2019-10-03 NOTE — CM/SW NOTE
10/03/19 1600   CM/SW Referral Data   Referral Source Physician   Reason for Referral Discharge planning   Informant Spouse;Edward Staff   Patient Info   Patient's Mental Status Alert   Patient Communication Issues Speech impairment   Patient lives with

## 2019-10-03 NOTE — PLAN OF CARE
Recived pt from OR with Dr. Lance Blizzard at bedside, pt intubated, slow to wake up from anesthesia. . VSS, afebrile. West intact, slight withdraw to pain. Dr. Lance Blizzard stayed at bedside, his PA updated family with plan of care.  Extubated pt at [phone] with anesthesia M

## 2019-10-03 NOTE — CONSULTS
[facility]  [facility] Patient Status:  Inpatient    1955 MRN [id number]   [facility] 6NE-A Attending Nayla Dumont MD   Hosp Day # 0 PCP JENN Sullivan     Reason for consult: Medical Management History: History reviewed. No pertinent family history. Allergies:   Penicillins             HIVES    Medications:    No current facility-administered medications on file prior to encounter.    Current Outpatient Medications on File Prior to Encounter: edema or cyanosis. Integument: No rashes or lesions.    Psychiatric: Unable to assess      Diagnostic Data:      Labs:  Recent Labs   Lab 10/02/19  1705   WBC 11.0   HGB 13.4   MCV 87.8   .0       No results for input(s): GLU, BUN, CREATSERUM, GFRAA

## 2019-10-03 NOTE — PROGRESS NOTES
[facility]  Neurosurgery Progress Note    Tomi Arnold Patient Status:  Inpatient    1955 MRN [id number]   [place] 6NE-A Attending Silviano Chen MD   Hosp Day # 1 PCP Carol Ann Najera     Chief Complaint:  Cervical m as left basal ganglia. The findings are most compatible   with areas of infarct.   Please note that only diffusion-weighted images were obtained.     =====  CONCLUSION:  Curvilinear areas of restricted diffusion primarily within the bilateral parietal lobe meds    DVT prophylaxis- SCDs TEDs      MARQUIS Ragland  [facility]  10/3/2019, 9:54 AM      Neurosurgery Attending    Patient was interviewed and examined by me with MARQUIS Ragland acting as scribe.   I agree with the note as wr

## 2019-10-03 NOTE — SLP NOTE
ADULT SWALLOWING EVALUATION    ASSESSMENT    ASSESSMENT/OVERALL IMPRESSION:  Patient seen for swallowing assessment.   He is post ACDF C4-C7 and also found to have stroke on imaging in the bilateral occipital and parietal as well as left basal ganglia and l Back problem    • Esophageal reflux    • Essential hypertension    • Hearing impairment     bilateral hearing aids   • High blood pressure    • High cholesterol    • Hyperlipidemia    • Muscle weakness     Left arm, lest leg   • Visual impairment     glass assess    Voice Quality: (hydrophonic)  Respiratory Status: Unlabored; Supplemental O2;Nasal cannula(also with nasal trumpet in place for suctioning)  Consistencies Trialed: (none)  Method of Presentation: Staff/Clinician assistance(moist swab only)  Patien

## 2019-10-04 NOTE — PLAN OF CARE
Assumed care of this patient at [address] [place] [address]. Drowsy, answers mostly with a yes or no appropriately, occasionally repetitive just saying yes. Opens eyes when called by name. Only able to move some fingers on the left hand to command.  Right side and left lower extr

## 2019-10-04 NOTE — SLP NOTE
Patient seen for attempted reassessment of swallowing function. He is asleep and clenches lips to moist swab. SLP able to render oral care on outer margin of teeth and in cheeks. He was not appropriate for any po trials at this time.   Will continue to f

## 2019-10-04 NOTE — PROGRESS NOTES
10/04/19 4484   Clinical Encounter Type   Visited With Patient and family together  (Lots of family in the room. )   Routine Visit Introduction   Referral From Other (Comment)  (consult. )   Referral To    Faith Encounters   Faith Needs

## 2019-10-04 NOTE — PLAN OF CARE
Assumed pt care this morning. At shift change being evaluated by Neurosurgery team with changes in neuro status. Has been having decreasing level of consciousness throughout the day. Not moving extremities at all, not withdrawing to pain.  Earlier had bee

## 2019-10-04 NOTE — PLAN OF CARE
Pt progressing slowly. More responsive as the day goes on. Able to answer many questions appropriately with one word answers. Still struggling with fluid speech. Follows commands intermittently. Fatigued throughout the day.  Lázaro Platt placed for meds [facility]

## 2019-10-04 NOTE — PROGRESS NOTES
[facility]  Neurosurgery Progress Note    Aziza Elywesalaina Patient Status:  Inpatient    1955 MRN [id number]   [facility] 6NE-A Attending Danika Núñez MD   Hosp Day # 2 PCP Carol Ann Hammond     Chief Complaint:  Cervical m There are scattered areas of hypoattenuation along the cortex of the bilateral cerebral hemispheres along with more confluent hypoattenuation in the bilateral basal ganglia in the areas of known evolving infarction as seen on previous MRI of the brai

## 2019-10-04 NOTE — OPERATIVE REPORT
[facility]    OPERATIVE REPORT    Patient:  Kapil Jeffries;  YOB: 1955     CSN:  [id number]; Medical Record Number:  [id number]    Admission Date:  10/2/2019 Operation Date:  10/2/2019    . ..........................     Operating Phy requiring packing to control.     Procedure:  After induction of adequate endotracheal anesthesia in the supine position on the operating room table the patient had sequential compression boots placed, West catheter placed, and a small towel roll placed un device still had some motion. Distraction pins were placed in the C5 and C6 vertebral bodies and traction was placed to open up the device.   The central core was removed with a Kocher clamp and then the endplate at C5 and C6 were each removed from the att variety of curettes, vertebral body right angled dissectors, and Kerrison bone rongeurs were utilized to remove herniated disc material, osteophyte, and posterior longitudinal ligament from pedicle to pedicle exposing the dura of the central canal. The rig canal and exiting nerve roots was nicely pulsatile upon completion of the neural decompression.     With the arthroplasty device removal, decompression of scar tissue and foraminal stenosis at C5-6, and C4-5 and C6-7 discectomy and microsurgical decompressi position on imaging and by visual inspection was accomplished the closure was initiated. The closure included generous irrigation down the lateral aspects of the bone grafts to insure no bleeding from the epidural space.  Retractors were removed and any

## 2019-10-04 NOTE — PROGRESS NOTES
[facility]  Neurocritical Care       Subjective: Jarred Zabala is a(n) 66year old male s/p C4-C7 Spinal Stenosis with myelopahty s/p C4-C7 ACDF and s/p C5-6 artificial disk removal. More lethargic today developing pneumonia.     Re elbow, LLE moves with pain, RLE moves with pain, RUE no movement to pain.     Diagnostics:   Xr Cervical Spine (4views) (cpt=72050)    Result Date: 9/30/2019  PROCEDURE:  XR CERVICAL SPINE (4VIEWS) (CPT=72050)  TECHNIQUE:  AP, lateral, obliques, and coned d performed through the brain. Dose reduction techniques were used. Dose information is transmitted to the ACR [facility] [employer] [facility] [employer]) NRDR ([address]) which includes the Dose Index Registry.   PATIENT STATED HISTORY: (As transc today.    FINDINGS:  Normal cervical lordosis. No significant spondylolisthesis. Postsurgical changes of anterior spinal fusion from C4 through C7. Prevertebral soft tissues are normal in thickness.   No masses or fluid collections are seen within the ce structures. There is straightening of the cervical lordosis. There is equivocal mild anterolisthesis of C5 on C6, not well assessed secondary to susceptibility artifact in this location.   The visualized vertebral body heights and disc heights are maintai R/O CVA post operative. FINDINGS:  There are curvilinear areas of restricted diffusion involving cortical gyri noted within the bilateral parietal lobes as well as within the left frontal lobe and bilateral occipital lobes as well as left basal ganglia. by Technologist)  Patient offered no additional history at this time. FINDINGS:       CONCLUSION:  Enteric catheter has tip in the gastric body.     Dictated by: Natacha Soriano MD on 10/03/2019 at 13:16     Approved by: Natacha Soriano MD C4-C5-C6-C7. Please also correlate with surgical findings. CONCLUSION:  Fluoroscopy and spot images for surgical planning.     Dictated by: Saray Mondragon MD on 10/02/2019 at 14:09     Approved by: Saray Mondragon MD            Cta Brain + Cta Carotids (cpt= and superior cerebellar arteries are unremarkable. The basilar artery has a normal course and caliber. There is a 3-vessel aortic arch. The origins of the branch vessels appear patent. The bilateral subclavian arteries are unremarkable.   The common ca process. If there is clinical concern for acute ischemia/infarction, an MRI of the brain would be recommended for further evaluation. Critical results were discussed with Dr. Angelica Richardson at 1640 hours on 10/2/2019. Critical results were read back.    Dictated b NaCl (CARDENE) 20 mg/200 ml premix infusion 5-15 mg/hr Intravenous Continuous PRN   phenylephrine in NaCl (SHANE-SYNEPHRINE) 50 mg/250 ml premix infusion SOLN 100-200 mcg/min Intravenous Continuous PRN   atorvastatin (LIPITOR) tab 80 mg 80 mg Oral Nightly 1 lozenge 1 lozenge Buccal Q15 Min PRN   Labetalol HCl (TRANDATE) injection 10 mg 10 mg Intravenous Q10 Min PRN   hydrALAzine HCl (APRESOLINE) injection 10 mg 10 mg Intravenous Q4H PRN   niCARdipine HCl (CARDENE) 50 mg in sodium chloride 0.9% 250 mL infusi skin breakdown. DVT Prophylaxis:  · SCD’s, Heparin  Goals of the Day: Ischemic Stroke Order set, -180, ASA, Lipitor, PT/OT and Speech Eval, Abx for pneumonia.   A total of 38 minutes of critical care time (exclusive of billable procedures) was admin

## 2019-10-04 NOTE — CONSULTS
Pulmonary / Critical Care H&P/Consult       NAME: Kori Gruber St: 5972/4316-G - MRN: [id number] - Age: 66year old - :  1955    Date of Admission: 10/2/2019  5:43 AM  Admission Diagnosis: Osteoarthritis of cervical spine with myelopathy       Spouse name: Not on file      Number of children: Not on file      Years of education: Not on file      Highest education level: Not on file    Occupational History      Not on file    Social Needs      Financial resource strain: Not on file walking        Bike Helmet: Not Asked        Seat Belt: Not Asked        Self-Exams: Not Asked    Social History Narrative      Not on file      Family History:  History reviewed. No pertinent family history.      Home Medications:    Outpatient Medications HCl, diphenhydrAMINE **OR** diphenhydrAMINE HCl, [DISCONTINUED] acetaminophen **OR** HYDROcodone-acetaminophen **OR** HYDROcodone-acetaminophen, HYDROmorphone HCl **OR** HYDROmorphone HCl **OR** HYDROmorphone HCl, diazepam, benzocaine-menthol, Labetalol HC No results for input(s): INR in the last 168 hours.       Recent Labs   Lab 10/03/19  0419 10/04/19  0406    141   K 3.4* 3.9    109   CO2 25.0 24.0   BUN 16 14     Creatinine (mg/dL)   Date Value   10/04/2019 0.82   10/03/2019 0.88   09/18/

## 2019-10-04 NOTE — PROGRESS NOTES
[facility]  Progress Note     Zanwing Gutierrez Patient Status:  Inpatient    1955 MRN [id number]   [facility] 6NE-A Attending Dayna Cortes MD   Hosp Day # 2 PCP JENN Mcgovern   Consulted by dr. Mariia Box for med [facility] Imaging data reviewed in Epic.     Medications:   • meropenem  500 mg Intravenous Q8H   • atorvastatin  80 mg Oral Nightly   • aspirin  300 mg Rectal Daily   • dexamethasone Sodium Phosphate  4 mg Intravenous Q12H   • scopolamine  1 patch Transdermal Q72H

## 2019-10-04 NOTE — PROCEDURES
ELECTROENCEPHALOGRAM REPORT      Patient Name: Eva Bustamantechure  Chart ID: [id number]  Ordering Physician: Rachael Baker                   Date of Test: 10/4/2019    A routine EEG was obtained. No sedation was given.     DX: ACUTE CVA  HX: PT IS A 67 YO MALE [facility]

## 2019-10-05 NOTE — PROGRESS NOTES
[address] Patient Status:  Inpatient    1955 MRN [id number]   [facility] 6NE-A Attending Harman Taylor MD   [address] Day # 3 PCP Carol Ann Grider     Critical Care Progress Note     Date of Admission: 10/2/ (TYLENOL) tab 650 mg, 650 mg, Oral, Q4H PRN **OR** acetaminophen (TYLENOL) 650 MG rectal suppository 650 mg, 650 mg, Rectal, Q4H PRN  •  niCARdipine HCl in NaCl (CARDENE) 20 mg/200 ml premix infusion, 5-15 mg/hr, Intravenous, Continuous PRN  •  phenylephri PRN  •  benzocaine-menthol (CEPACOL (SUGAR-FREE)) 1 lozenge, 1 lozenge, Buccal, Q15 Min PRN  •  Labetalol HCl (TRANDATE) injection 10 mg, 10 mg, Intravenous, Q10 Min PRN  •  hydrALAzine HCl (APRESOLINE) injection 10 mg, 10 mg, Intravenous, Q4H PRN  •  Roly management per neuro team  - bp parameters per neuro; on low dose norbert to meet goals. - repeat MRI with progression/worsening of bilateral infarcts  4. S/p C spine surgery  - per surgery  5. Fen: dobhoff in place. Start TF's, IVFs, lytes prn  6.  Prophy:

## 2019-10-05 NOTE — RESPIRATORY THERAPY NOTE
Received pt on 5L nasal cannula tolerating well. Pt having difficulty clearing secretions so frequently NT suctioning through nasal trumpet bringing up large amounts of thick tan secretions.  Around 2330 pts breathing appeared more labored before his neb tx

## 2019-10-05 NOTE — SLP NOTE
Patient now intubated to vent due to deterioration in status. SLP service to sign off at this time. Will need new order so please re-consult if/when indicated. Thank you.   Adela Jarvis MS CCC-SLP/L, pager [phone]  Speech-LanguagePathologist

## 2019-10-05 NOTE — PROGRESS NOTES
Notified by RN that MRI brain/DWI completed. Results reviewed. Neuro [facility] updated. Patient remains somnolent--wakes to noxious stimuli and has flexion of all extremities to pain stimulus--lower extremities responding quicker than upper extremities.   Rita placed--plan to minimize sedation use as able. Family updated by RN and patients sister. JILLIAN Danielle  Critical Care NP  [place] [phone]: [phone]  Pgr: [phone]    Follow ABG noted--ventilator setting maintained for now.   Pulmonary CCI, Dr Agusto Pastor

## 2019-10-05 NOTE — PROGRESS NOTES
[facility]    Patients Name: Felicia Benoit  Attending Physician: Barrington Quinn MD  CSN: [id number]   Location:  3381/1277-Y  MRN: [id number]    YOB: 1955  Admission Date: 10/2/2019     Intubation    Called to Intubate Patient.

## 2019-10-05 NOTE — PROGRESS NOTES
10/05/19 0543   Clinical Encounter Type   Visited With Family  (Spoke with wife Abe Cartagena on the phone. )   Routine Visit Follow-up   Patient's Supportive Strategies/Resources Assessed psychosocial and spiritual needs.     Referral From Other (Comment)  [facility]

## 2019-10-05 NOTE — PROGRESS NOTES
[facility]  Neurocritical Care       Subjective: Sindhu Avila is a(n) 66year old male s/p C4-C7 Spinal Stenosis with myelopahty s/p C4-C7 ACDF and s/p C5-6 artificial disk removal. More lethargic today developing pneumonia.   10/5 coned down view of the spine were obtained. COMPARISON:  [facility] , XR CERVICAL SPINE (4VIEWS) (CPT=72050), 4/05/2019, 14:31. [facility] , XR CERVICAL SPINE (2 VIEWS) (CPT=72040), 9/11/2018, 10:44.   INDICATIONS:  M47.12 Other spondylosis with myelopathy, cervi transcribed by Technologist)  Altered mental status. Previous brain finding. FINDINGS: Ventricles and sulci are within normal limits. There is no midline shift or mass-effect. The basal cisterns are patent.   The gray-white matter differentiation is int cervical spinal canal.  No areas of cord signal abnormality are seen. Cervical cord is normal in course and caliber. No significant central canal stenosis is seen at any level within the cervical spine.   Evaluation slightly limited due to metallic artifa maintained. There  is minimal scattered disc desiccation. No suspicious focal osseous lesions identified. The cervical cord at C5-C6 is obscured secondary to susceptibility artifact. The remainder of the cervical cord is unremarkable.   C2-C3:  Mild fac ganglia. The findings are most compatible with areas of infarct. Please note that only diffusion-weighted images were obtained.       CONCLUSION:  Curvilinear areas of restricted diffusion primarily within the bilateral parietal lobes but also noted withi Xr Chest Ap Portable  (cpt=71045)    Result Date: 10/3/2019  PROCEDURE:  XR CHEST AP PORTABLE  (CPT=71045)  TECHNIQUE:  AP chest radiograph was obtained. COMPARISON:  [facility] , CTA BRAIN + CTA CAROTIDS (OWJ=[id number]), 10/02/2019, 16:04.   DRAKE (WVL=01903/22686)    Result Date: 10/2/2019  PROCEDURE:  CTA BRAIN + CTA CAROTIDS (CPT=70496/72317)  COMPARISON:  None. INDICATIONS:  Cervical fusion.   TECHNIQUE:  CT angiography of the head and neck were obtained with non-ionic contrast.  3D volume rende carotid arteries are patent. The carotid bifurcations appear unremarkable. There is no evidence of hemodynamically significant stenosis in the carotid bulbs by NASCET criteria. The cervical internal carotid arteries are patent.   The right cervical inter Gabriela Mcguire MD on 10/02/2019 at 16:36     Approved by: Gabriela Mcguire MD              Lab Review     Lab Results   Component Value Date    WBC 15.9 10/05/2019    HGB 12.2 10/05/2019    HCT 36.0 10/05/2019    .0 10/05/2019    CREATSERUM 0.86 levETIRAcetam (KEPPRA) 500 mg in sodium chloride 0.9% 100 mL IVPB 500 mg Intravenous Q12H   lactated ringers infusion  Intravenous Continuous   Meropenem (MERREM) 500 mg in sodium chloride 0.9% 100 mL  mg Intravenous Q8H   atorvastatin (LIPITOR) t Intravenous Q2H PRN   Or      HYDROmorphone HCl (DILAUDID) 1 MG/ML injection 0.4 mg 0.4 mg Intravenous Q2H PRN   Or      HYDROmorphone HCl (DILAUDID) 1 MG/ML injection 0.8 mg 0.8 mg Intravenous Q2H PRN   diazepam (VALIUM) tab 5 mg 5 mg Oral Q6H PRN   benzo [facility] filed at 10/5/2019 0645  Gross per 24 hour   Intake 2928. 5 ml   Output no documentation   Net 2928. 5 ml     · IV Fluids NS      GI:  · GI Prophylaxis  · Bowel Regimen   · doboff     Heme/ID:  · WBC 15.9, pna on abx  · Monitor H/H with goal

## 2019-10-05 NOTE — PROGRESS NOTES
Neurological Surgery Attending    Oralee Alisha Arnold    Interval History: Work of breathing became difficult last night he was intubated. Interval Examination: Currently on Precedex when rounding.   Eyes are conjugate and oculocephalic responses are norm

## 2019-10-05 NOTE — PROGRESS NOTES
[facility] HOSPITALIST  Progress Note     Tyler Rincon Patient Status:  Inpatient    1955 MRN [id number]   [facility] 6NE-A Attending Richie Guzman MD   Hosp Day # 3 PCP JENN Goddard   Consulted by dr. Johnathon Marie for med [facility] Epic.    Medications:   • mannitol 20% infusion  25 g Intravenous Q6H   • docusate sodium  100 mg Oral BID   • Chlorhexidine Gluconate  15 mL Mouth/Throat [email or web address]   • mannitol       • levETIRAcetam  500 mg Intravenous Q12H   • meropenem  500 mg Intra

## 2019-10-05 NOTE — PROGRESS NOTES
Assumed pt care at 0730. Patient vented and on minimal Precedex. Pt neuro checks Q1h. Pupillometer used. See flowsheet for neuro checks. Blood pressure kept in parameters of 140-180.  Discussed with wife and family that risk management will be in to see t

## 2019-10-05 NOTE — RESPIRATORY THERAPY NOTE
Art line placed per MD order. Left radial, threaded with no resistance. Good waveform and blood return.

## 2019-10-06 NOTE — PLAN OF CARE
Assumed care of patient at 1500, Precedex off at that time, patient did have minimal non purposeful movement and slightly opened eyes. Precedex re started due to biting of the ET tube and restlessness, Hema fostern at beside and informed of this.  Patient no

## 2019-10-06 NOTE — PLAN OF CARE
Patient is intubated and sedated on low dose Precedex drip at 0.3mcg/kg/min. Pupils are equal and reactive, he is not following commands, responds to painful stimuli. He grimaces when off sedation and bites his ETT.  Right neck swelling present, site is sof

## 2019-10-06 NOTE — PROGRESS NOTES
10/06/19 1307   Clinical Encounter Type   Patient's Supportive Strategies/Resources Patient's family is very supportive of the patient. However, the patient's wife is upset about the outcome of the surgical procedure.   The patient's wife aand the  elisa

## 2019-10-06 NOTE — PROGRESS NOTES
Pharmacy Note:  Route Optimization for Levetiracetam (Keppra)         Patient is currently on levetiracetam 500mg IV q12hr.   The patient meets the criteria to convert to the oral equivalent as established by the IV to oral conversion protocol approved by

## 2019-10-06 NOTE — PROGRESS NOTES
10/06/19 8014   Clinical Encounter Type   Visited With   (Patient's wife came later on during the visit and was crying very much during the visit.    provided prayer and pastoral ministry to the family.)   Patient's Supportive Strategies/Resource

## 2019-10-06 NOTE — PROGRESS NOTES
[facility]  Neurocritical Care       Subjective: Parker Lew is a(n) 66year old male s/p C4-C7 Spinal Stenosis with myelopahty s/p C4-C7 ACDF and s/p C5-6 artificial disk removal. More lethargic today developing pneumonia.   10/5 (CPT=72050)  TECHNIQUE:  AP, lateral, obliques, and coned down view of the spine were obtained. COMPARISON:  [facility] , XR CERVICAL SPINE (4VIEWS) (CPT=72050), 4/05/2019, 14:31. EDWARD , XR CERVICAL SPINE (2 VIEWS) (CPT=72040), 9/11/2018, 10:44.   INDICATIO Dose Index Registry. PATIENT STATED HISTORY: (As transcribed by Technologist)  Altered mental status. Previous brain finding. FINDINGS: Ventricles and sulci are within normal limits. There is no midline shift or mass-effect.   The basal cisterns are pat No masses or fluid collections are seen within the cervical spinal canal.  No areas of cord signal abnormality are seen. Cervical cord is normal in course and caliber. No significant central canal stenosis is seen at any level within the cervical spine. vertebral body heights and disc heights are maintained. There  is minimal scattered disc desiccation. No suspicious focal osseous lesions identified. The cervical cord at C5-C6 is obscured secondary to susceptibility artifact.   The remainder of the cerv bilateral occipital lobes as well as left basal ganglia. The findings are most compatible with areas of infarct. Please note that only diffusion-weighted images were obtained.       CONCLUSION:  Curvilinear areas of restricted diffusion primarily within t at 13:16     Approved by: Karo Gonzalez MD            Xr Chest Ap Portable  (cpt=71045)    Result Date: 10/3/2019  PROCEDURE:  XR CHEST AP PORTABLE  (CPT=71045)  TECHNIQUE:  AP chest radiograph was obtained.   COMPARISON:  [facility] , [address] MD Laura            Cta Brain + Cta Carotids (OBW=46307/79784)    Result Date: 10/2/2019  PROCEDURE:  CTA BRAIN + CTA CAROTIDS (VXX=85140/92629)  COMPARISON:  None. INDICATIONS:  Cervical fusion.   TECHNIQUE:  CT angiography of the head and neck were obt subclavian arteries are unremarkable. The common carotid arteries are patent. The carotid bifurcations appear unremarkable. There is no evidence of hemodynamically significant stenosis in the carotid bulbs by NASCET criteria.   The cervical internal rao 10/2/2019. Critical results were read back.    Dictated by: Gildardo Miles MD on 10/02/2019 at 16:36     Approved by: Gildardo Miles MD              Lab Review     Lab Results   Component Value Date    WBC 12.2 10/06/2019    HGB 11.2 10/06/2019    HCT mg Oral BID   Chlorhexidine Gluconate (PERIDEX) 0.12 % solution 15 mL 15 mL Mouth/Throat [email or web address]   Dexmedetomidine HCl in NaCl (PRECEDEX) 400 MCG/100ML premix infusion 0.2-1.5 mcg/kg/hr (Dosing Weight) Intravenous Continuous   levETIRAcetam (KEPPRA) HYDROmorphone HCl (DILAUDID) 1 MG/ML injection 0.4 mg 0.4 mg Intravenous Q2H PRN   Or      HYDROmorphone HCl (DILAUDID) 1 MG/ML injection 0.8 mg 0.8 mg Intravenous Q2H PRN   diazepam (VALIUM) tab 5 mg 5 mg Oral Q6H PRN   benzocaine-menthol (CEPACOL (Louvella Black · IV Fluids NS      GI:  · GI Prophylaxis  · Bowel Regimen   · doboff     Heme/ID:  · WBC 12.2, pna on abx  · Monitor H/H with goal hemoglobin more than 7gm/dl  Endocrine:  · Hyperglycemia protocol if req  Skin:  · Monitor for skin breakdown.   DVT Prop

## 2019-10-06 NOTE — PROGRESS NOTES
[address] [place] [address] Patient Status:  Inpatient    1955 MRN [id number]   [facility] 6NE-A Attending John Fox MD   [facility] Day # 4 PCP Carol Ann Burnham     Critical Care Progress Note     Date of Admission: 10/2/ (CARDENE) 20 mg/200 ml premix infusion, 5-15 mg/hr, Intravenous, Continuous PRN  •  phenylephrine in NaCl (SHANE-SYNEPHRINE) 50 mg/250 ml premix infusion SOLN, 100-200 mcg/min, Intravenous, Continuous PRN  •  aspirin 300 MG rectal suppository 300 mg, 300 mg, mg, 10 mg, Intravenous, Q4H PRN  •  niCARdipine HCl (CARDENE) 50 mg in sodium chloride 0.9% 250 mL infusion, 5-15 mg/hr, Intravenous, Continuous     OBJECTIVE:  /72   Pulse (!) 49   Temp 98.6 °F (37 °C) (Temporal)   Resp 19   Ht 5' 8\" (1.727 m)   Wt down significantly   2. Pneumonia: high risk for aspiration- has RLL infiltrate  - sputum culture pending  - meropenem D#3  3.  CVA-  - management per neuro team  - strict BP parameters per neuro; on low dose norbert to meet goals, keeping SBP>140.   - repeat M

## 2019-10-06 NOTE — PROGRESS NOTES
Neurological Surgery Attending    Zeny Arnold    Interval History: No unusual events overnight. He remains sedated on Precedex for agitation due to endotracheal tube. I halted the Precedex for 40 minutes prior to my examination today.     Interval

## 2019-10-06 NOTE — PROGRESS NOTES
[facility] HOSPITAL[facility]  Progress Note     Sindhu Willson Patient Status:  Inpatient    1955 MRN [id number]   [facility] 6NE-A Attending Carol Goodrich MD   Hosp Day # 4 PCP JENN Gentile   Consulted by dr. Raúl Coto for med [facility] [place] 0.7 mg/dL). No results for input(s): PTP, INR in the last 168 hours. Recent Labs   Lab 10/03/19  0419   TROP <0.045            Imaging: Imaging data reviewed in Epic.     Medications:   • docusate sodium  100 mg Oral BID   • Chlorhexidine Gluconate  1

## 2019-10-06 NOTE — PROGRESS NOTES
10/06/19 1227   Clinical Encounter Type   Visited With Family; Health care provider   Routine Visit Introduction   Continue Visiting Yes  (Family requests daily visits for prayer, even if family is not with the patient.)   Referral From Nurse   Referral

## 2019-10-06 NOTE — PROGRESS NOTES
10/06/19 4298   Clinical Encounter Type   Visited With   (Patient's wife came later on during the visit and was crying very much during the visit.    provided prayer and pastoral ministry to the family.)   Patient's Supportive Strategies/Resource

## 2019-10-07 NOTE — PROGRESS NOTES
10/07/19 0844   Clinical Encounter Type   Visited With Health care provider   Continue Visiting   (Per nurse, patient's wife does not require daily  visits.   Also, per nurse, patient's wife declines anointing at this time.)

## 2019-10-07 NOTE — PROGRESS NOTES
10/06/19 6038   Clinical Encounter Type   Visited With Family   Routine Visit Follow-up   Continue Visiting No  (Family will request furthre ministry, as needed by calling pager [phone])   Patient's Supportive Strategies/Resources  offered emotiona

## 2019-10-07 NOTE — PROGRESS NOTES
10/07/19 0856   Clinical Encounter Type   Continue Visiting No   Sacramental Encounters   Sacrament of Sick-Anointing Patient declined anointing  (Per nurse, patient's wife declined anointing and also declined daily  visits.)

## 2019-10-07 NOTE — PROGRESS NOTES
[facility] Patient Status:  In[facility]    1955 MRN [id number]   [facility] 6NE-A Attending Danielle Mcmanus MD   [address] Day # 5 PCP Carol Ann Harrison     Critical Care Progress Note     Date of Admission: 10/2/ acetaminophen (TYLENOL) 650 MG rectal suppository 650 mg, 650 mg, Rectal, Q4H PRN  •  phenylephrine in NaCl (SHANE-SYNEPHRINE) 50 mg/250 ml premix infusion SOLN, 100-200 mcg/min, Intravenous, Continuous PRN  •  dexamethasone Sodium Phosphate (DECADRON) 4 MG/ 56   Temp 98.7 °F (37.1 °C) (Temporal)   Resp 14   Ht 5' 8\" (1.727 m)   Wt 224 lb 3.3 oz (101.7 kg)   SpO2 99%   BMI 34.09 kg/m²      Ventilator Settings: 14/500/5/40%      Wt Readings from Last 3 Encounters:  10/07/19 : 224 lb 3.3 oz (101.7 kg)  04/05/19 MS improves  - secretions continue to be signfiicant, would hold on active weaning until mental status improves, as low likelihood he'll be able to manage degree of secretions at current state  2.  Pneumonia: high risk for aspiration- has RLL infiltrate  -

## 2019-10-07 NOTE — OCCUPATIONAL THERAPY NOTE
Attempted to see pt but pt was intubated and drowsy. Will check back later as schedule and time permits. Followed up with nurse. Pt not responsive at this time. Will place pt on restorative with PROM with restorative aide.

## 2019-10-07 NOTE — TELEPHONE ENCOUNTER
Started PA through [facility] on 09/30/19     Norco 10-325MG tablets Approved   PA Case ID: [id number]   Key: [facility]    Approved  CaseId:[id number];Status:Approved; Review Type:Prior Auth; Coverage Start Date:09/07/2019; Coverage End Date:10/06/2020;

## 2019-10-07 NOTE — PROGRESS NOTES
Assumed care at 0730. Patient received intubated and sedated. Sedation turned off per neurosurgery request.  Patient opening eyes more frequently, continues to not follow commands.   Small spontaneous non purposeful movements noted to left foot and bilater

## 2019-10-07 NOTE — PLAN OF CARE
Patient is intubated requiring very small amount of sedation. Patient opens his eyes occasionally, both arms tensed when fully awake. He does not follow any commands. Bite block in placed, patient bites on his ETT a lot.  With small amount of thick oral sec

## 2019-10-07 NOTE — PHYSICAL THERAPY NOTE
Patient presented semi-supine in bed; VSS. Performed restorative BUE/BLE PROM regimen in all planes as directed by PT/OT. Upon completion, patient left side-lying in bed for comfort. Patient family present throughout. ROSALIND Woodall aware of session.

## 2019-10-07 NOTE — PROGRESS NOTES
[facility]  Neuro Critical Care Progress Note    [address] Patient Status:  Inpatient    1955 MRN [id number]   [facility] 6NE-A Attending Silviano Chen MD   Hosp Day # 5 PCP JENN Najera     Subjective:  Patient PRN **OR** fentaNYL citrate (SUBLIMAZE) 0.05 MG/ML injection 50 mcg, 50 mcg, Intravenous, Q30 Min PRN  •  fentaNYL citrate (SUBLIMAZE) 1,000 mcg in sodium chloride 0.9% 100 mL infusion,  mcg/hr, Intravenous, Continuous PRN  •  docusate sodium (COLACE tablet, Oral, Q4H PRN **OR** HYDROcodone-acetaminophen (NORCO)  MG per tab 2 tablet, 2 tablet, Oral, Q4H PRN  •  HYDROmorphone HCl (DILAUDID) 1 MG/ML injection 0.2 mg, 0.2 mg, Intravenous, Q2H PRN **OR** HYDROmorphone HCl (DILAUDID) 1 MG/ML injection direct patient intervention, complex decision making, and/or extensive discussions with the patient, family, and clinical staff.     Naveed Holley MD  Neurocritical care  [facility]  10/7/2019, 12:25 PM

## 2019-10-07 NOTE — PHYSICAL THERAPY NOTE
Pt required emergent re-intubation for airway protection on 10/5/19. Pt remains intubated and sedated at this time, unable to follow commands. Will place pt on IP mobility for continuation of PROM exercises with therapy aide.   Please re-order skilled phy

## 2019-10-07 NOTE — TELEPHONE ENCOUNTER
Spoke with pharmacist [facility] @ [facility] and informed of approval below. Per [facility] medication went through [employer].

## 2019-10-07 NOTE — PROGRESS NOTES
[facility]  Neurosurgery Progress Note    Oralolayinka Arnold Patient Status:  Inpatient    1955 MRN [id number]   [facility] 6NE-A Attending Elder Hernandez MD   Hosp Day # 5 PCP Carol Ann Schulte     Chief Complaint:  Cervical m pulmonary  PT/OT/ST when appropriate  DVT prophylaxis- SCDs TEDs subq Heparin  Wean Decadron      MARQUIS Wilson  10/7/2019, 9:05 AM

## 2019-10-08 NOTE — PROGRESS NOTES
Thank you for consulting [facility] to evaluate this patient for potential organ/tissue donation. Based on our review, there are no contraindications to donation if patient progresses to brain death or if the family decides to withdraw support.  Please in

## 2019-10-08 NOTE — PROGRESS NOTES
[facility]PITAL[facility]  Progress Note     [facility] Patient Status:  Inpatient    1955 MRN [id number]   [facility] 6NE-A Attending Rodrigo Velazquez MD   Hosp Day # 6 PCP Carol Ann Funes     Chief Complaint: stroke    S: Pt constance --   --   --    ALT 18 16  --   --   --   --    BILT 0.6 0.7  --   --   --   --    TP 6.5 6.5  --   --   --   --     < > = values in this interval not displayed. Estimated Creatinine Clearance: 108.8 mL/min (A) (based on SCr of 0.69 mg/dL (L)). monitor    Quality:  · DVT Prophylaxis: scds  · CODE status: full    Will the patient be referred to [facility] on discharge?: tbd  Estimated date of discharge: tbd  Discharge is dependent on: clinical course  At this point Mr. Tana Ansari is expected to be dischar

## 2019-10-08 NOTE — PROGRESS NOTES
[facility]  Neurosurgery Progress Note    Bharati Arnold Patient Status:  Inpatient    1955 MRN [id number]   [facility] 6NE-A Attending Carol Goodrich MD   Hosp Day # 6 PCP Carol Ann Gentile     Chief Complaint:  Cervical m

## 2019-10-08 NOTE — OCCUPATIONAL THERAPY NOTE
OCCUPATIONAL THERAPY TREATMENT NOTE - INPATIENT     Room Number: 8256/8484-N  Session: 1   Number of Visits to Meet Established Goals: 3;Trial    Presenting Problem: C4-C7 ACDF, CVA    History related to current admission: Pt is a 66year old male admit on ACTIVITIES OF DAILY LIVING ASSESSMENT  AM-PAC ‘6-Clicks’ Inpatient Daily Activity Short Form  How much help from another person does the patient currently need…  -   Putting on and taking off regular lower body clothing?: Total  -   Bathing (including training;Continued evaluation; Compensatory technique education; Neuromuscluar reeducation;Equipment eval/education; Fine motor coordination activities; Patient/Family training;Patient/Family education;Cognitive reorientation; Endurance training;UE strengthenin

## 2019-10-08 NOTE — PROGRESS NOTES
[facility] Patient Status:  [facility]    1955 MRN [id number]   [facility] 6NE-A Attending Danielle Mcmanus MD   [address] Day # 6 PCP Carol Ann Harrison     Critical Care Progress Note     Date of Admission: 10/2/ Gluconate (PERIDEX) 0.12 % solution 15 mL, 15 mL, Mouth/Throat, [email or web address]  •  Dexmedetomidine HCl in NaCl (PRECEDEX) 400 MCG/100ML premix infusion, 0.2-1.5 mcg/kg/hr (Dosing Weight), Intravenous, Continuous  •  Meropenem (MERREM) 500 mg in sodium chlor HYDROmorphone HCl (DILAUDID) 1 MG/ML injection 0.8 mg, 0.8 mg, Intravenous, Q2H PRN  •  diazepam (VALIUM) tab 5 mg, 5 mg, Oral, Q6H PRN  •  benzocaine-menthol (CEPACOL (SUGAR-FREE)) 1 lozenge, 1 lozenge, Buccal, Q15 Min PRN  •  Labetalol HCl (TRANDATE) inj CO2 26.0 10/08/2019    BUN 20 10/08/2019    CREATSERUM 0.69 10/08/2019     10/08/2019    CA 7.7 10/08/2019     Lab Results   Component Value Date    INR 1.03 09/18/2019    INR 1.04 09/13/2017           Imaging:  I have independently visualized all r

## 2019-10-08 NOTE — PROGRESS NOTES
[facility]  Neuro Critical Care Progress Note    [address] Patient Status:  Inpatient    1955 MRN [id number]   [facility] 6NE-A Attending Richa Solorzano MD   Hosp Day # 6 PCP JENN Nickerson     Subjective:  Patient solution 3 mL, 3 mL, Nebulization, 6 times per day  •  fentaNYL citrate (SUBLIMAZE) 0.05 MG/ML injection 25 mcg, 25 mcg, Intravenous, Q30 Min PRN **OR** fentaNYL citrate (SUBLIMAZE) 0.05 MG/ML injection 50 mcg, 50 mcg, Intravenous, Q30 Min PRN  •  fentaNYL [DISCONTINUED] acetaminophen (TYLENOL) tab 650 mg, 650 mg, Oral, Q4H PRN **OR** HYDROcodone-acetaminophen (NORCO)  MG per tab 1 tablet, 1 tablet, Oral, Q4H PRN **OR** HYDROcodone-acetaminophen (NORCO)  MG per tab 2 tablet, 2 tablet, Oral, Q4H P his phenylephrine, will check with Neurosurgery  - No evidence of seizures on EEG or clinically, will stop the Keppra    Discussed with family regarding prognosis.  Likely he will need trach/PEG, unsure of the insult although the MRI brain images profoundly

## 2019-10-08 NOTE — PLAN OF CARE
Assumed care of Christopher @ approximately 4219: lightly sedated on low dose Precedex gtt, but stuporous, transiently and spontaneously opens his eye as well as occasionally to voice and painfully stimuli; Q1H neuro checks as charted; ventilated w/bite block in p independent ADL's.  - See additional Care Plan goals for specific interventions   Outcome: Not Progressing  Goal: Patient/Family Short Term Goal  Description  Patient's Short Term Goal: Eat, get OOB, talk, SHIPLEY.     Interventions:   - PT/OT/SP, neuro checks Impaired Activities of Daily Living  Goal: Achieve highest/safest level of independence in self care  Description  Interventions:  - Assess ability and encourage patient to participate in ADLs to maximize function  - Promote sitting position while performi

## 2019-10-08 NOTE — PROGRESS NOTES
Assumed pt care at 0730. Pt received on vent with minimal sedation. Neuro checks Q1hour. Patient opens eyes spontaneously. Patient trying to follow commands in the LUE. Withdraws to pain in all other extremities 2/5.    Pupils reactive- right eye with do

## 2019-10-08 NOTE — PLAN OF CARE
Assumed patient care this am. Patient on precedex and norbert gtts. Patient does not follow commands. Does not open eyes at times, opens spontaneously at times, and opens to noxious stimulus at times. Pupils equal and reactive.  Patient resists RN trying to ope

## 2019-10-09 NOTE — PROGRESS NOTES
[facility]  Neurosurgery Progress Note    Sarah Arnold Patient Status:  Inpatient    1955 MRN [id number]   [facility] 6NE-A Attending Matthew Odom MD   Hosp Day # 7 PCP Carol Ann Norton     Chief Complaint:  Cervical m Pratibha  10/9/2019, 9:24 AM

## 2019-10-09 NOTE — PLAN OF CARE
[facility]  in Department and patient case reviewed. Will continue to follow patients' case.

## 2019-10-09 NOTE — RESPIRATORY THERAPY NOTE
Received pt on vent VC+ 14/500/40%/+5, Duoneb given Q4. No changes made at this time, no weaning done at this time, will continue to monitor.

## 2019-10-09 NOTE — PLAN OF CARE
Pt opens eyes spontaneously, rightward gaze. No commands followed. Hypertonia in all extremities through PROM. Facial grimace to minimal external stimulation.  Bite block maintained to protect oral airway; noted teeth clenching with oral care, position

## 2019-10-09 NOTE — PROGRESS NOTES
[facility]  Neuro Critical Care Progress Note    [address] Patient Status:  Inpatient    1955 MRN [id number]   [facility] 6NE-A Attending John Fox MD   Hosp Day # 7 PCP JENN Burnham     Subjective:  Patient 4 MG/ML injection 4 mg, 4 mg, Intravenous, Q24H  •  Heparin Sodium (Porcine) 5000 UNIT/ML injection 5,000 Units, 5,000 Units, Subcutaneous, Q8H MELANIE  •  glycerin-Hypromellose- (ARTIFICAL TEARS) 0.2-0.2-1 % ophthalmic solution 1 drop, 1 drop, Both Eye [DISCONTINUED] acetaminophen (TYLENOL) tab 650 mg, 650 mg, Oral, Q4H PRN **OR** HYDROcodone-acetaminophen (NORCO)  MG per tab 1 tablet, 1 tablet, Oral, Q4H PRN **OR** HYDROcodone-acetaminophen (NORCO)  MG per tab 2 tablet, 2 tablet, Oral, Q4H P Neurochecks  - Starting provigil today    Discussed with family regarding prognosis again. Will need trach/PEG.  Family deciding still    A total of 35 minutes of critical care time (exclusive of billable procedures) was administered to manage and/or preven

## 2019-10-09 NOTE — PLAN OF CARE
Assumed care of this patient at 0730. Neuro checks every hour per order. Fentanyl gtt infusing for comfort/sedation. Patient opens eyes spontaneously. Patient does not follow commands or move extremities spontaneously.  Patient does withdraw all 4 extremiti hemorrhage  - Monitor temperature, glucose, and sodium.  Initiate appropriate interventions as ordered  Outcome: Progressing  Goal: Achieves maximal functionality and self care  Description  INTERVENTIONS  - Monitor swallowing and airway patency with patien Manage/alleviate anxiety  - Monitor for signs/symptoms of CO2 retention  Outcome: Progressing     Problem: METABOLIC/FLUID AND ELECTROLYTES - ADULT  Goal: Hemodynamic stability and optimal renal function maintained  Description  INTERVENTIONS:  - Monitor l

## 2019-10-09 NOTE — PROGRESS NOTES
[address] [place] [address] Patient Status:  Inpatient    1955 MRN [id number]   [facility] 6NE-A Attending Xiao Marie MD   Hosp Day # 7 [facility]     S: remains intuba Height:            Ventilator Settings: AC 14// FIO2 40/PEEP 5      Ppk 14   Ppl 11      Wt Readings from Last 3 Encounters:  10/09/19 : 222 lb 14.2 oz (101.1 kg)  04/05/19 : 228 lb (103.4 kg)  10/24/18 : 245 lb (111.1 kg)       I/O last 3 complete keeping SBP>140.   - repeat MRI with progression/worsening of bilateral infarcts  - not following commands  4. S/p C spine surgery  - per surgery  5. Fen: TFs at goal, IVFs, lytes prn  6. Prophy: scds; heparin sq  7. Dispo: full code.  ICU.  Critically ill

## 2019-10-09 NOTE — PROGRESS NOTES
[facility]  Progress Note     April Santiago Patient Status:  Inpatient    1955 MRN [id number]   [facility] 6NE-A Attending Gregory Estes MD   Hosp Day # 7 PCP Carol Ann Meza     Chief Complaint: stroke    S: Pt constance 24.0   < > 27.0 26.0  --  28.0   ALKPHO 86 79  --   --   --   --   --    AST 17 16  --   --   --   --   --    ALT 18 16  --   --   --   --   --    BILT 0.6 0.7  --   --   --   --   --    TP 6.5 6.5  --   --   --   --   --     < > = values in this interval anuerysm and occlusion and contained extravasation; per neurosurgery  7. Mediastinum fullness and tracheal narrowing  8. HL  9.  Anxiety    Prognosis looks rather poor; continue to monitor    Quality:  · DVT Prophylaxis: scds  · CODE status: full    Will th

## 2019-10-09 NOTE — RESPIRATORY THERAPY NOTE
Received pt vented, VC+ 14/500/40%/+5, tolerating well. Duoneb Q4. Suctioning moderate to large amounts of thick white secretions from ETT. Duoneb Q4. Will continue to monitor closely.

## 2019-10-10 NOTE — PROGRESS NOTES
[facility]  Neurosurgery Progress Note    Gigi Arnold Patient Status:  Inpatient    1955 MRN [id number]   [facility] 6NE-A Attending Blanca Johnston MD   Hosp Day # 8 PCP Carol Ann Duenas     Chief Complaint:  Cervical m ordered  Medical management per hospitalist, pulmonary  Provigil per neuroCC  DVT prophylaxis- SCDs TEDs Heparin      MARQUIS Long  [facility]  10/10/2019, 8:50 AM      Neurosurgery Attending    Patient was interviewed and examine

## 2019-10-10 NOTE — PLAN OF CARE
Received pt at [address] [place] [address], intubated, fentanyl gtt infusing for pt comfort. Pt not FC or moving extremities spontaneously. All extremities stiff when moving passively. Pt bites down and squints eyes to tactile stimulus.  Frequent suctioning of large amount of ora

## 2019-10-10 NOTE — PROGRESS NOTES
[address] [place] [address] Patient Status:  Inpatient    1955 MRN [id number]   [facility] 6NE-A Attending Sarah Sue MD   Hosp Day # 8 PCP Carol Ann Claudio / Critical Care Progress Note     S: Pt remains int Temp:       TempSrc:       SpO2: 98% 98% 98% 97%   Weight:       Height:            Ventilator Settings: AC 14// FIO2 40/PEEP 5      Ppk 15   Ppl 11      Wt Readings from Last 3 Encounters:  10/10/19 : 223 lb 8.7 oz (101.4 kg)  04/05/19 : 228 lb (1 of bilateral infarcts  - not following commands  4. S/p C spine surgery  - per surgery  5. Fen: TFs at goal, IVFs, lytes prn  6. Prophy: scds; heparin sq  7. Dispo: full code.  ICU.  Critically ill; d/w family at bedside     Critical Care Time greater than:

## 2019-10-10 NOTE — PROGRESS NOTES
[facility]  Progress Note     Romero Harden Patient Status:  Inpatient    1955 MRN [id number]   [facility] 6NE-A Attending George Harrison MD   Hosp Day # 8 PCP Carol Ann Higgins     Chief Complaint: stroke    S: Pt constance    < > 105  --  103  --  103   CO2 24.0   < > 26.0  --  28.0  --  30.0   ALKPHO 79  --   --   --   --   --   --    AST 16  --   --   --   --   --   --    ALT 16  --   --   --   --   --   --    BILT 0.7  --   --   --   --   --   --    TP 6.5  --   - contained extravasation; per neurosurgery  7. Mediastinum fullness and tracheal narrowing  8. HL  9.  Anxiety    Prognosis looks rather poor; continue to monitor, awaiting decision on trach/peg    Quality:  · DVT Prophylaxis: scds  · CODE status: full    Wi

## 2019-10-10 NOTE — PROGRESS NOTES
[facility]  Neuro Critical Care Progress Note    [address] Patient Status:  Inpatient    1955 MRN [id number]   [facility] 6NE-A Attending Richa Solorzano MD   Hosp Day # 8 PCP JENN Nickerson     Subjective:  Patient injection 5,000 Units, 5,000 Units, Subcutaneous, Q8H [address]  •  glycerin-Hypromellose- (ARTIFICAL TEARS) 0.2-0.2-1 % ophthalmic solution 1 drop, 1 drop, Both Eyes, QID PRN  •  aspirin chewable tab 324 mg, 324 mg, Per NG Tube, Daily  •  ipratropium-alb HYDROcodone-acetaminophen (NORCO)  MG per tab 1 tablet, 1 tablet, Oral, Q4H PRN **OR** HYDROcodone-acetaminophen (NORCO)  MG per tab 2 tablet, 2 tablet, Oral, Q4H PRN  •  HYDROmorphone HCl (DILAUDID) 1 MG/ML injection 0.2 mg, 0.2 mg, Intravenou they are inclining to comfort measures only. For now he is DNR.  They will have final decision by tomorrow  - Continue Provigil for now        A total of 35 minutes of critical care time (exclusive of billable procedures) was administered to manage and/or p

## 2019-10-10 NOTE — PROGRESS NOTES
Assessment and Objective  \"Progressing\"  INTERVENTIONS:  - Assess for changes in respiratory status  - Assess for changes in mentation and behavior  - Position to facilitate oxygenation and minimize respiratory effort  - Oxygen supplementation based on o Rhonchii BS bilaterally but clear when suctioned. Plans:   -Continue nebs Q4         Continue to monitor.

## 2019-10-10 NOTE — RESPIRATORY THERAPY NOTE
Received pt vented, VC+ 14/500/40%/+5. Duoneb Q4. Suctioning large amounts of secretions from ETT. Breath sounds diminished. Will continue to monitor closely.

## 2019-10-10 NOTE — PROGRESS NOTES
Thank you for calling in this referral to [facility] Watson. The patient is a candidate for tissue and eye donation. He is not a candidate for cardiac death donation, only if he were to progress to brain death. Please call [facility] with time of death.

## 2019-10-11 NOTE — RESPIRATORY THERAPY NOTE
Received patient on full vent support, VC+ 14/500/40%/+5. No changes made overnight. No events overnight. Breath sounds are coarse, but clear after suction. Produces a small-moderate amount of thick, white sputum. Continue Q4 Duoneb as ordered.   [employer]

## 2019-10-11 NOTE — PLAN OF CARE
Received pt at 1930. Pt intubated, fentanyl gtt for pt comfort, prn boluses for pt grimacing. Family at bedside, pt son stating they are thinking of possibly withdrawing care tomorrow, will discuss with family further tonight.  Pt not FC or moving extremiti

## 2019-10-11 NOTE — PROGRESS NOTES
[facility]  Neuro Critical Care Progress Note    [address] Patient Status:  Inpatient    1955 MRN [id number]   [facility] 6NE-A Attending Sammie Diaz MD   [facility] Day # 9 PCP JENN MCKEON     Subjective:  No drast mg, 25 mg, Intravenous, Q4H PRN  •  PEG 3350 (MIRALAX) powder packet 17 g, 17 g, Per NG Tube, Daily PRN  •  Heparin Sodium (Porcine) 5000 UNIT/ML injection 5,000 Units, 5,000 Units, Subcutaneous, Q8H [address]  •  glycerin-Hypromellose- (ARTIFICAL TEARS) tab 650 mg, 650 mg, Oral, Q4H PRN **OR** HYDROcodone-acetaminophen (NORCO)  MG per tab 1 tablet, 1 tablet, Oral, Q4H PRN **OR** HYDROcodone-acetaminophen (NORCO)  MG per tab 2 tablet, 2 tablet, Oral, Q4H PRN  •  HYDROmorphone HCl (DILAUDID) 1 M changes  - Patient's clinical picture is convincing for hypoxemic ischemic event although no clear history of that.  Vit B12 checked which was on the lower side although not low enough to suspect acute B12 deficiency post anaesthesia and usually acute B12 d

## 2019-10-11 NOTE — PLAN OF CARE
Assumed pt care at 0730. Pt's VSS. Q4h Neuro checks. Pt does not follow any commands, pupils fixed and round, extension to pain in all extremities. Cough present. Pt spontaneously opens eyes but does not track. Continuous Fentanyl drip for pain.  Care confe

## 2019-10-11 NOTE — PROGRESS NOTES
[facility]  Progress Note     Evelyne Reagan Patient Status:  Inpatient    1955 MRN [id number]   [facility] 6NE-A Attending Pavan Welsh MD   Hosp Day # 9 PCP Carol Ann Abrams     Chief Complaint: stroke    S: Pt constance 0.99       No results for input(s): TROP, CK in the last 168 hours. Imaging: Imaging data reviewed in Epic.     Medications:   • dexamethasone Sodium Phosphate  2 mg Intravenous Q24H   • modafinil  100 mg Oral Daily   • atorvastatin  40 mg Per NG Tu

## 2019-10-11 NOTE — PLAN OF CARE
Assumed patient care this am. Patient abnormally extending bilateral upper extremities to noxious stimulus. Pupils 7mm in size, nonreactive. Pupillometer gives reading on right eye, does not on left.  Patient resisting opening of left eye, difficult to asse

## 2019-10-11 NOTE — PROGRESS NOTES
[facility]  Neurosurgery Progress Note    Elida Arnold Patient Status:  Inpatient    1955 MRN [id number]   [facility] 6NE-A Attending Sachi Stack MD   Hosp Day # 9 PCP Carol Ann Ashraf     Chief Complaint:  Cervical m 8:12 AM      Neurosurgery Attending    Patient seen and examined by me today. There is more eye-opening. By report from the family he seems to regard his sister significantly with eye contact.   For me he looks in my direction but does not obviously track

## 2019-10-11 NOTE — PROGRESS NOTES
[address] [place] [address] Patient Status:  Inpatient    1955 MRN [id number]   [facility] 6NE-A Attending Julio Vivar MD   [address] Day # 9 PCP [place], [place] SCHUYLER     Critical Care Progress Note     Date of Admission: 10/2/ Intravenous, Q30 Min PRN **OR** fentaNYL citrate (SUBLIMAZE) 0.05 MG/ML injection 50 mcg, 50 mcg, Intravenous, Q30 Min PRN  •  fentaNYL citrate (SUBLIMAZE) 1,000 mcg in sodium chloride 0.9% 100 mL infusion,  mcg/hr, Intravenous, Continuous PRN  •  Ch benzocaine-menthol (CEPACOL (SUGAR-FREE)) 1 lozenge, 1 lozenge, Buccal, Q15 Min PRN  •  Labetalol HCl (TRANDATE) injection 10 mg, 10 mg, Intravenous, Q10 Min PRN  •  hydrALAzine HCl (APRESOLINE) injection 10 mg, 10 mg, Intravenous, Q4H PRN     OBJECTIVE: visualized all relevant chest imaging in PACS. I agree with the radiology interpretation except where noted. ASSESSMENT/PLAN:  1. Acute resp failure: secondary to pneumonia and mucous plugging along with inability to protect airway.    - cont full ve

## 2019-10-12 NOTE — PROGRESS NOTES
10/12/19 0535   Clinical Encounter Type   Visited With Patient and family together  (wife and extended family at bedside)   Routine Visit Follow-up  (after introduction from sister in law, wife welcomed prayer from the  and reported how much she

## 2019-10-12 NOTE — PLAN OF CARE
Assumed care of this patient at approximately 1930. Monitor shows SR/SR 90's-120's. Patient received on comfort measures. Morphine and ativan drips infusing.  Ativan and morphine boluses given for patient comfort, increased work of breathing, as ordered, p rails  - Instruct patient/family to notify RN of any seizure activity  - Instruct patient/family to call for assistance with activity based on assessment  Outcome: Not Progressing     Problem: Impaired Functional Mobility  Goal: Achieve highest/safest leve

## 2019-10-14 ENCOUNTER — TELEPHONE (OUTPATIENT)
Dept: INTERNAL MEDICINE | Age: 64
End: 2019-10-14

## 2019-10-15 ENCOUNTER — TELEPHONE (OUTPATIENT)
Dept: INTERNAL MEDICINE | Age: 64
End: 2019-10-15

## 2019-10-15 NOTE — DISCHARGE SUMMARY
[facility]  Discharge Summary    Ashli Arnold Patient Status:  Inpatient    1955 MRN [id number]   [facility] 6NE-A Attending No att. providers found   [address] Day # 10 PCP Carol Ann Chou     Date of Admission: 10/2/2019    D Illness: Sallie Tompkins a 66year [address] male being admitted forC5-6 ADR removal and C4-5-6-7 ACDF.  He is complaining of cervical pain which is a 6/10,  left shoulder pain 5/10.  Left arm pain which is a 4/10 which radiates down into the hand and goe helped left shoulder and arm. He is here to discuss CS XR and MRI. Left  weak again. Left leg weakness. Walking left leg harder.  Pain is better than before.     Last hx:  Since his last visit he feels like he is getting worse. Zeb Deal states his neck is st neck pain.  He is taking Norco mostly at night Flexeril twice a day.  His ability to walk and difficulty using the left leg especially climbing up hills of slight incline has gotten worse.  Overall he was better after surgery he has regressed slightly over conditioning program.     September 2016 he saw Dr. Felisa Woodson for his left finger disability.  At that time he had no neck pain no arm pain no numbness or tingling or weakness he had focal disability in his left fourth finger.     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   He was closely monitored by Dr. Madhavi Mensah who ordered a MRI brain and cervic 10/12 at 0445.          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

## 2019-10-16 ENCOUNTER — TELEPHONE (OUTPATIENT)
Dept: INTERNAL MEDICINE | Age: 64
End: 2019-10-16

## 2019-10-17 ENCOUNTER — TELEPHONE (OUTPATIENT)
Dept: SURGERY | Facility: CLINIC | Age: 64
End: 2019-10-17

## 2019-10-17 NOTE — TELEPHONE ENCOUNTER
Received death certificate from [facility]. Death certificate signed by Lokesh Huertas and faxed to [phone] medical examiner.

## 2019-10-29 ENCOUNTER — TELEPHONE (OUTPATIENT)
Dept: SURGERY | Facility: CLINIC | Age: 64
End: 2019-10-29

## 2019-10-29 NOTE — TELEPHONE ENCOUNTER
Pt's wife will either drop off or fax over AMADO request with Executor letter for all records. Specified ALL records. Upon expressing my condolences pt's wife stated \"you can thank Anderson\" for her loss.

## 2019-10-31 NOTE — TELEPHONE ENCOUNTER
Death certificate sent to scan. @nd copy that was sent over is labeled courtesy copy. Do not fax this copy back. Forms sent to scan.

## 2020-01-06 ENCOUNTER — TELEPHONE (OUTPATIENT)
Dept: INTERNAL MEDICINE | Age: 65
End: 2020-01-06

## 2021-05-25 VITALS
RESPIRATION RATE: 16 BRPM | TEMPERATURE: 98.4 F | DIASTOLIC BLOOD PRESSURE: 90 MMHG | SYSTOLIC BLOOD PRESSURE: 152 MMHG | OXYGEN SATURATION: 98 % | HEART RATE: 86 BPM

## 2022-01-27 ENCOUNTER — TELEPHONE (OUTPATIENT)
Dept: SURGERY | Facility: CLINIC | Age: 67
End: 2022-01-27

## 2022-01-27 NOTE — TELEPHONE ENCOUNTER
Received request for any & all medical records, bills and imaging from [employer]. Original sent to SCAN/STAT, copy sent to scanning.

## 2023-04-11 NOTE — PAYOR COMM NOTE
--------------  ADMISSION REVIEW     Payor: [employer] [facility] PPO  Subscriber #:  [id number]  Authorization Number: N/A    Admit date: N/A  Admit time: N/A       Admitting Physician: Kiara Rose MD  Attending Physician:  MD KWAME Kim and weakness and left leg weakness.     Patient gives a history of being a  for [employer] where he was working at [facility] on or about 11/30/2015 in [address] [place] he was working solo on a [employer] [place] working on a 1 inch s aching and burning.  Then started to shoot down into his left second through fourth fingers.  He was given a tentative diagnosis after his 2 week follow-up of thoracic outlet syndrome by Dr. Yvon Lazar.     Patient was sent for an EMG April 18, 2017 EMG was the index middle and ring finger.  He has pain into the left biceps.  He gets numbness and tingling down the arm in the same distribution.  It is a shooting pain through his left elbow.  He feels his left shoulder and left biceps are weak.  Feels like his Left      Comment: knee scope      FAMILY HISTORY:  family history is not on file.     SOCIAL HISTORY:   reports that he has quit smoking. He does not have any smokeless tobacco history on file. He reports that he drinks about 1.2 oz of alcohol per week . cervical forward flexion he gets spasm and tightness in his left wrist and forearm and his left lower leg. . With cervical extension and right rotation spasticity improves.     Upper extremity strength: Left external rotation 5-       Deltoid    Triceps     Lobo Ortiz on 10/2/2017  6:33 AM   Attribution Vazquez     TP. 1 - Lobo Ortiz on 10/2/2017  6:16 AM                    MEDICATIONS ADMINISTERED IN LAST 1 DAY:        AmLODIPine Besylate (NORVASC) tab 5 mg     Date Action Dose Route User (SENOKOT) tab TABS 17.2 mg     Date Action Dose Route User    10/2/2017 2048 Given 17.2 mg Oral Nikki Orellana, RN      0.9%  NaCl infusion     Date Action Dose Route User    10/2/2017 [address] (none) Intravenous Ashanti Foss, ROSALIND    10/2/2017 1222 [Normal Posterior Cervical Nodes] : no posterior cervical lymphadenopathy [Normal Anterior Cervical Nodes] : no anterior cervical lymphadenopathy [Normal] : no rash [Coordination Grossly Intact] : coordination grossly intact [No Focal Deficits] : no focal deficits [Normal Gait] : normal gait [Speech Grossly Normal] : speech grossly normal [Memory Grossly Normal] : memory grossly normal [Normal Affect] : the affect was normal [Alert and Oriented x3] : oriented to person, place, and time [Normal Mood] : the mood was normal [Normal Insight/Judgement] : insight and judgment were intact

## (undated) DEVICE — GOWN,SIRUS,FABRIC-REINFORCED,X-LARGE: Brand: MEDLINE

## (undated) DEVICE — SUTURE VICRYL 3-0 RB-1

## (undated) DEVICE — DRAPE MICROSCOPE NEURO PENTERO

## (undated) DEVICE — SOL  .9 1000ML BTL

## (undated) DEVICE — LAMINECTOMY CDS: Brand: MEDLINE INDUSTRIES, INC.

## (undated) DEVICE — Device

## (undated) DEVICE — UNDYED BRAIDED (POLYGLACTIN 910), SYNTHETIC ABSORBABLE SUTURE: Brand: COATED VICRYL

## (undated) DEVICE — DRAPE C-ARM UNIVERSAL

## (undated) DEVICE — SUTURE VICRYL 3-0 SH

## (undated) DEVICE — TOTAL CERVICAL DISC REPLACEMENT SYSTEM: MOBI-C CERVICAL DISC PROSTHESIS: Brand: MOBI-C® PLUG & FIT US

## (undated) DEVICE — CAUTERY BLADE 2IN INS E1455

## (undated) DEVICE — DRAPE,THYROID,SOFT,STERILE: Brand: MEDLINE

## (undated) DEVICE — DRILL BIT 7080510 11 MM DRILL BIT S

## (undated) DEVICE — KENDALL SCD EXPRESS SLEEVES, KNEE LENGTH, MEDIUM: Brand: KENDALL SCD

## (undated) DEVICE — TRANSPOSAL ULTRAFLEX DUO/QUAD ULTRA CART MANIFOLD

## (undated) DEVICE — ABSORBABLE HEMOSTAT (OXIDIZED REGENERATED CELLULOSE, U.S.P.): Brand: SURGICEL

## (undated) DEVICE — TZ MEDICAL TITANIUM DISTRACTION PINS 14MM: Brand: TZ MEDICAL TITANIUM DISTRACTION PINS

## (undated) DEVICE — #15 STERILE STAINLESS BLADE: Brand: STERILE STAINLESS BLADES

## (undated) DEVICE — GLOVE SURG TRIUMPH SZ 8

## (undated) DEVICE — CODMAN® SURGICAL PATTIES 1/2" X 1/2" (1.27CM X 1.27CM): Brand: CODMAN®

## (undated) DEVICE — GLOVE BIOGEL M SURG SZ 71/2

## (undated) DEVICE — PAD SACRAL SPAN AID

## (undated) DEVICE — SUTURE VICRYL 0 CP-1

## (undated) DEVICE — INSTRUMENT 7080902 PLATE HOLDING PIN

## (undated) DEVICE — GAMMEX® NON-LATEX PI TEXTURED SIZE 7.5, STERILE POLYISOPRENE POWDER-FREE SURGICAL GLOVE: Brand: GAMMEX

## (undated) DEVICE — DRAPE TABLE COVER 44X90 TC-10

## (undated) DEVICE — KENDALL SCD EXPRESS SLEEVES, THIGH LENGTH, MEDIUM: Brand: KENDALL SCD

## (undated) DEVICE — 3M(TM) TEGADERM(TM) TRANSPARENT FILM DRESSING FRAME STYLE 1628: Brand: 3M™ TEGADERM™

## (undated) DEVICE — DRILL NEURO SOFT TOUCH 3.0X3.8

## (undated) DEVICE — TAPE CLOTH ADHESIVE 3

## (undated) DEVICE — 3M™ MEDIPORE™ SOFT CLOTH TAPE, 4 INCH X 10 YARDS, 12 ROLLS/CASE, 2964: Brand: 3M™ MEDIPORE™

## (undated) DEVICE — BANDAGE ROLL,100% COTTON, 6 PLY, LARGE: Brand: KERLIX

## (undated) DEVICE — PROBE DOPPLER MIZUHO 07-150-07

## (undated) DEVICE — C-ARMOR C-ARM EQUIPMENT COVERS CLEAR STERILE UNIVERSAL FIT 12 PER CASE: Brand: C-ARMOR

## (undated) DEVICE — FLOSEAL SEALENT STERILE 10ML

## (undated) DEVICE — FLOSEAL HEMOSTATIC MATRIX, 5ML: Brand: FLOSEAL HEMOSTATIC MATRIX

## (undated) DEVICE — LIGHT HANDLE

## (undated) DEVICE — 5.0MM PRECISION ROUND

## (undated) DEVICE — SUTURE VICRYL 0 CT-1

## (undated) DEVICE — 3.0MM PRECISION ROUND

## (undated) DEVICE — SPONGE: SPECIALTY PEANUT XR 100/CS: Brand: MEDICAL ACTION INDUSTRIES

## (undated) NOTE — LETTER
17          Kyle King  :  1955      To Whom It May Concern: This patient was seen in our office on 17 . Work status: OFF work pending reevaluation.     If this office may be of further assistance, please do not hesitate to co

## (undated) NOTE — LETTER
2017      RE: Julieth Aldanaiz     : 1955    Dear Dr. Artemio Alexis,    This letter is to inform you that your patient is being scheduled for surgery with Dr. Aubree Kaur on 10/02/2017 at [facility].    Diagnosis: Cervical Myelopath with Jasvir Marking

## (undated) NOTE — LETTER
[address], [place]  Authorization for Invasive Procedures  1.  I hereby authorize Dr. Aisha Moody , my physician and whomever may be designated as the doctor's assistant, to perform the following operation and/or procedure:  Cervical performed for the purposes of advancing medicine, science, and/or education, provided my identity is not revealed. If the procedure has been videotaped, the physician/surgeon will obtain the original videotape.  The hospital will not be responsible for stor My signature below affirms that prior to the time of the procedure, I have explained to the patient and/or his legal representative, the risks and benefits involved in the proposed treatment and any reasonable alternative to the proposed treatment.  I have

## (undated) NOTE — LETTER
17          Nicole Arnold  :  1955      To Whom It May Concern: This patient was seen in our office on 17 .   Work status:  OFF work pending reeval.    If this office may be of further assistance, please do not hesitate to conta

## (undated) NOTE — LETTER
18          Ria Arnold  :  1955      To Whom It May Concern:    Patient has been treated for a cervical condition. His cervical condition is stable he may proceed with surgery to his shoulder by Dr. Sammie Samson.     If this office may be o

## (undated) NOTE — LETTER
7/14/2017    Dr. Erica [facility]  [address] [facility] [address], [address]    Subject: Ezio Money    Dear Dr. Maria T Posadas:     Thank you for the referral of Maxi Estrada for a spine evaluation regarding his cer

## (undated) NOTE — LETTER
Date: 11/13/2017    Patient Name: Sindhu Willson          To Whom it may concern: This letter has been written at the patient's request. The above patient was seen at the [facility] for treatment of a medical condition.     This patient

## (undated) NOTE — LETTER
10/04/18          Marian Arnold  :  1955      To Whom It May Concern: This patient was seen in our office on 10/04/18 . Work status:  Off work    If this office may be of further assistance, please do not hesitate to contact us.       [facility]

## (undated) NOTE — LETTER
18          Yvon Arnold  :  1955      To Whom It May Concern: This patient was seen in our office on 18 . Work status:  OFF WORK. If this office may be of further assistance, please do not hesitate to contact us.       [facility]

## (undated) NOTE — Clinical Note
He is schedule for C5-7 ACDF. He has new weakness in C4-5. Scheduled for 10/2/19.  I think C4-5 needs to be included

## (undated) NOTE — LETTER
18          Nicole Arnold  :  1955      To Whom It May Concern: This patient was seen in our office on 18 . Work status: Off work  If this office may be of further assistance, please do not hesitate to contact us.       [facility]

## (undated) NOTE — LETTER
08/15/17          Josephine Arnold  :  1955      To Whom It May Concern: This patient was seen in our office on 08/15/17 . Work status: off work      If this office may be of further assistance, please do not hesitate to contact us.       [facility]

## (undated) NOTE — LETTER
10/16/17          Nunu Arnold  :  1955      To Whom It May Concern: This patient was seen in our office on 10/16/17 . Work status:  Off work until follow up.     If this office may be of further assistance, please do not hesitate to cont

## (undated) NOTE — LETTER
Etelvina Carmen [address]  [place], [address] [facility]    Consent for Operation  Date: __________________                                Time: _______________    1.  I authorize the performance upon Marlyne Flow the following operation:  Proced revealed by the pictures or by descriptive texts accompanying them. If the procedure has been videotaped, the surgeon will obtain the original videotape. The hospital will not be responsible for storage or maintenance of this tape.   7. For the purpose of a THAT MY DOCTOR PROVIDED ME WITH THE ABOVE EXPLANATIONS, THAT ALL BLANKS OR STATEMENTS REQUIRING INSERTION OR COMPLETION WERE FILLED IN.     Signature of Patient:   ___________________________    When the patient is a minor or mentally incompetent to give co iii. All of the medicines I take (including prescriptions, herbal supplements, and pills I can buy without a prescription (including street drugs/illegal medications).  Failure to inform my anesthesiologist about these medicines may increase my risk of anes _____________________________________________________________________________  Anesthesiologist Signature     Date   Time  I have discussed the procedure and information above with the patient (or patient’s representative) and answered their questions.  The